# Patient Record
Sex: FEMALE | Race: BLACK OR AFRICAN AMERICAN | Employment: UNEMPLOYED | URBAN - METROPOLITAN AREA
[De-identification: names, ages, dates, MRNs, and addresses within clinical notes are randomized per-mention and may not be internally consistent; named-entity substitution may affect disease eponyms.]

---

## 2017-07-25 PROBLEM — R41.0 DISORIENTED: Status: ACTIVE | Noted: 2017-07-25

## 2018-03-02 PROBLEM — R41.0 DISORIENTED: Status: RESOLVED | Noted: 2017-07-25 | Resolved: 2018-03-02

## 2018-03-13 ENCOUNTER — HOSPITAL ENCOUNTER (OUTPATIENT)
Dept: SPEECH THERAPY | Age: 43
Setting detail: THERAPIES SERIES
Discharge: HOME | End: 2018-03-13
Payer: COMMERCIAL

## 2018-03-13 PROCEDURE — G8999 MOTOR SPEECH CURRENT STATUS: HCPCS

## 2018-03-13 PROCEDURE — 92523 SPEECH SOUND LANG COMPREHEN: CPT

## 2018-03-13 PROCEDURE — G9186 MOTOR SPEECH GOAL STATUS: HCPCS
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
## 2018-03-30 ENCOUNTER — APPOINTMENT (OUTPATIENT)
Dept: SPEECH THERAPY | Age: 43
End: 2018-03-30
Payer: COMMERCIAL

## 2018-04-06 ENCOUNTER — HOSPITAL ENCOUNTER (OUTPATIENT)
Dept: SPEECH THERAPY | Age: 43
Setting detail: THERAPIES SERIES
Discharge: HOME | End: 2018-04-06
Payer: COMMERCIAL

## 2018-04-06 PROCEDURE — G0515 COGNITIVE SKILLS DEVELOPMENT: HCPCS

## 2018-04-13 ENCOUNTER — HOSPITAL ENCOUNTER (OUTPATIENT)
Dept: SPEECH THERAPY | Age: 43
Setting detail: THERAPIES SERIES
Discharge: HOME | End: 2018-04-13
Payer: COMMERCIAL

## 2018-04-13 PROCEDURE — G8999 MOTOR SPEECH CURRENT STATUS: HCPCS

## 2018-04-13 PROCEDURE — G9186 MOTOR SPEECH GOAL STATUS: HCPCS

## 2018-04-13 PROCEDURE — G0515 COGNITIVE SKILLS DEVELOPMENT: HCPCS

## 2018-04-20 ENCOUNTER — HOSPITAL ENCOUNTER (OUTPATIENT)
Dept: SPEECH THERAPY | Age: 43
Setting detail: THERAPIES SERIES
Discharge: HOME | End: 2018-04-20
Payer: COMMERCIAL

## 2018-04-20 PROCEDURE — G0515 COGNITIVE SKILLS DEVELOPMENT: HCPCS

## 2018-04-20 PROCEDURE — G8999 MOTOR SPEECH CURRENT STATUS: HCPCS

## 2018-04-20 PROCEDURE — G9186 MOTOR SPEECH GOAL STATUS: HCPCS

## 2018-04-27 ENCOUNTER — APPOINTMENT (OUTPATIENT)
Dept: SPEECH THERAPY | Age: 43
End: 2018-04-27
Payer: COMMERCIAL

## 2018-05-04 ENCOUNTER — HOSPITAL ENCOUNTER (OUTPATIENT)
Dept: SPEECH THERAPY | Age: 43
Setting detail: THERAPIES SERIES
Discharge: HOME | End: 2018-05-04
Payer: COMMERCIAL

## 2018-05-04 PROCEDURE — G8999 MOTOR SPEECH CURRENT STATUS: HCPCS

## 2018-05-04 PROCEDURE — G9186 MOTOR SPEECH GOAL STATUS: HCPCS

## 2018-05-04 PROCEDURE — G0515 COGNITIVE SKILLS DEVELOPMENT: HCPCS

## 2018-05-18 ENCOUNTER — APPOINTMENT (OUTPATIENT)
Dept: SPEECH THERAPY | Age: 43
End: 2018-05-18
Payer: COMMERCIAL

## 2018-06-01 ENCOUNTER — HOSPITAL ENCOUNTER (OUTPATIENT)
Dept: SPEECH THERAPY | Age: 43
Setting detail: THERAPIES SERIES
Discharge: HOME | End: 2018-06-01
Payer: COMMERCIAL

## 2018-06-01 PROCEDURE — 97127 HC SP THER IVNTJ W/FOCUS COG FUNCJ: CPT

## 2018-06-01 PROCEDURE — G9186 MOTOR SPEECH GOAL STATUS: HCPCS

## 2018-06-01 PROCEDURE — G8999 MOTOR SPEECH CURRENT STATUS: HCPCS

## 2018-06-05 ENCOUNTER — HOSPITAL ENCOUNTER (OUTPATIENT)
Dept: SPEECH THERAPY | Age: 43
Setting detail: THERAPIES SERIES
Discharge: HOME | End: 2018-06-05
Payer: COMMERCIAL

## 2018-06-05 PROCEDURE — G9186 MOTOR SPEECH GOAL STATUS: HCPCS

## 2018-06-05 PROCEDURE — 92507 TX SP LANG VOICE COMM INDIV: CPT

## 2018-06-05 PROCEDURE — G8999 MOTOR SPEECH CURRENT STATUS: HCPCS

## 2018-06-15 ENCOUNTER — HOSPITAL ENCOUNTER (OUTPATIENT)
Dept: SPEECH THERAPY | Age: 43
Setting detail: THERAPIES SERIES
Discharge: HOME | End: 2018-06-15
Payer: COMMERCIAL

## 2018-06-15 PROCEDURE — 97127 HC SP THER IVNTJ W/FOCUS COG FUNCJ: CPT

## 2018-06-15 PROCEDURE — G8999 MOTOR SPEECH CURRENT STATUS: HCPCS

## 2018-06-15 PROCEDURE — G9186 MOTOR SPEECH GOAL STATUS: HCPCS

## 2018-06-22 ENCOUNTER — APPOINTMENT (OUTPATIENT)
Dept: SPEECH THERAPY | Age: 43
End: 2018-06-22
Payer: COMMERCIAL

## 2018-06-29 ENCOUNTER — HOSPITAL ENCOUNTER (OUTPATIENT)
Dept: SPEECH THERAPY | Age: 43
Setting detail: THERAPIES SERIES
Discharge: HOME | End: 2018-06-29
Payer: COMMERCIAL

## 2018-06-29 PROCEDURE — 92507 TX SP LANG VOICE COMM INDIV: CPT

## 2018-06-29 PROCEDURE — G8999 MOTOR SPEECH CURRENT STATUS: HCPCS

## 2018-06-29 PROCEDURE — G9186 MOTOR SPEECH GOAL STATUS: HCPCS

## 2018-07-02 ENCOUNTER — TELEPHONE (OUTPATIENT)
Dept: NEUROLOGY | Age: 43
End: 2018-07-02

## 2018-07-02 NOTE — TELEPHONE ENCOUNTER
Patient came in  for an appointment she thought that she had scheduled today, I told her that she did not have one until July 16th and she said that she will not be coming back and that she does not want to reschedule. She wanted a letter saying we were discharging her and I stated that we were not discharging her therefore I did not give her a letter discharging her from our practice. She understood and left the office.

## 2018-07-06 ENCOUNTER — APPOINTMENT (OUTPATIENT)
Dept: SPEECH THERAPY | Age: 43
End: 2018-07-06
Payer: COMMERCIAL

## 2018-07-27 ENCOUNTER — HOSPITAL ENCOUNTER (OUTPATIENT)
Dept: SPEECH THERAPY | Age: 43
Setting detail: THERAPIES SERIES
Discharge: HOME | End: 2018-07-27
Payer: COMMERCIAL

## 2018-07-27 PROCEDURE — G9186 MOTOR SPEECH GOAL STATUS: HCPCS

## 2018-07-27 PROCEDURE — G8999 MOTOR SPEECH CURRENT STATUS: HCPCS

## 2018-07-27 PROCEDURE — G9169 MEMORY GOAL STATUS: HCPCS

## 2018-07-27 PROCEDURE — G9168 MEMORY CURRENT STATUS: HCPCS

## 2018-07-27 PROCEDURE — 97127 HC SP THER IVNTJ W/FOCUS COG FUNCJ: CPT

## 2018-07-27 NOTE — PROGRESS NOTES
Ms. Eric Plunkett was seen for motor speech and cognitive therapy today. The following was targeted:  · Patient maintained her rate of speech without using a metronome today. She demonstrated appropriate rate. · Patient increased pitch and/or appropriately used pausing to indicate stress with % accuracy in conversation today. · Memory was targeted during a sustained attention activity that coupled auditory and visual stimuli. Patient sustained attention for up to 9 targets. · Memory was also targeted during a matching exercise. Patient utilized organizational strategies and verbal rehearsal to recall cards. She demonstrated the need for only minimal cues to assist.  · Eye contact was good today. Minimal cues were needed for eye contact. Patient was very animated today. She continues to report that the change in her Bipolar medications has helped. Homework was provided in order to aid in carryover. The next visit is cancelled due to the therapist's scheduled absence. Therapy is expected to resume on the patient's next scheduled visit. Continue plan of care. Treatment plan and goals can be found in the progress report.

## 2018-08-03 ENCOUNTER — APPOINTMENT (OUTPATIENT)
Dept: SPEECH THERAPY | Age: 43
End: 2018-08-03
Payer: COMMERCIAL

## 2018-08-08 ENCOUNTER — HOSPITAL ENCOUNTER (OUTPATIENT)
Age: 43
Discharge: HOME | End: 2018-08-08
Payer: COMMERCIAL

## 2018-08-08 LAB
ALBUMIN SERPL-MCNC: 3.9 G/DL (ref 3.5–5.2)
ALP BLD-CCNC: 54 U/L (ref 35–104)
ALT SERPL-CCNC: 8 U/L (ref 0–32)
ANION GAP SERPL CALCULATED.3IONS-SCNC: 11 MMOL/L (ref 7–16)
ANISOCYTOSIS: ABNORMAL
APTT: 28.8 SEC (ref 24.5–35.1)
AST SERPL-CCNC: 16 U/L (ref 0–31)
ATYPICAL LYMPHOCYTE RELATIVE PERCENT: 4 % (ref 0–4)
BASOPHILS ABSOLUTE: 0 E9/L (ref 0–0.2)
BASOPHILS RELATIVE PERCENT: 0 % (ref 0–2)
BILIRUB SERPL-MCNC: 0.5 MG/DL (ref 0–1.2)
BUN BLDV-MCNC: 9 MG/DL (ref 6–20)
CALCIUM SERPL-MCNC: 8.8 MG/DL (ref 8.6–10.2)
CHLORIDE BLD-SCNC: 102 MMOL/L (ref 98–107)
CHOLESTEROL, TOTAL: 174 MG/DL (ref 0–199)
CO2: 27 MMOL/L (ref 22–29)
CREAT SERPL-MCNC: 0.8 MG/DL (ref 0.5–1)
EOSINOPHILS ABSOLUTE: 0.06 E9/L (ref 0.05–0.5)
EOSINOPHILS RELATIVE PERCENT: 2 % (ref 0–6)
GFR AFRICAN AMERICAN: >60
GFR NON-AFRICAN AMERICAN: >60 ML/MIN/1.73
GLUCOSE BLD-MCNC: 82 MG/DL (ref 74–109)
HCT VFR BLD CALC: 33.7 % (ref 34–48)
HDLC SERPL-MCNC: 42 MG/DL
HEMOGLOBIN: 11.3 G/DL (ref 11.5–15.5)
INR BLD: 1.1
LDL CHOLESTEROL CALCULATED: 106 MG/DL (ref 0–99)
LYMPHOCYTES ABSOLUTE: 1.74 E9/L (ref 1.5–4)
LYMPHOCYTES RELATIVE PERCENT: 52 % (ref 20–42)
MCH RBC QN AUTO: 32.7 PG (ref 26–35)
MCHC RBC AUTO-ENTMCNC: 33.5 % (ref 32–34.5)
MCV RBC AUTO: 97.4 FL (ref 80–99.9)
MONOCYTES ABSOLUTE: 0.16 E9/L (ref 0.1–0.95)
MONOCYTES RELATIVE PERCENT: 5 % (ref 2–12)
NEUTROPHILS ABSOLUTE: 1.15 E9/L (ref 1.8–7.3)
NEUTROPHILS RELATIVE PERCENT: 37 % (ref 43–80)
OVALOCYTES: ABNORMAL
PDW BLD-RTO: 14.2 FL (ref 11.5–15)
PLATELET # BLD: 64 E9/L (ref 130–450)
PLATELET CONFIRMATION: NORMAL
PMV BLD AUTO: 11.3 FL (ref 7–12)
POIKILOCYTES: ABNORMAL
POTASSIUM SERPL-SCNC: 3.7 MMOL/L (ref 3.5–5)
PROTHROMBIN TIME: 12.5 SEC (ref 9.3–12.4)
RBC # BLD: 3.46 E12/L (ref 3.5–5.5)
SODIUM BLD-SCNC: 140 MMOL/L (ref 132–146)
TEAR DROP CELLS: ABNORMAL
TOTAL PROTEIN: 8.1 G/DL (ref 6.4–8.3)
TRIGL SERPL-MCNC: 128 MG/DL (ref 0–149)
VLDLC SERPL CALC-MCNC: 26 MG/DL
WBC # BLD: 3.1 E9/L (ref 4.5–11.5)

## 2018-08-08 PROCEDURE — 85025 COMPLETE CBC W/AUTO DIFF WBC: CPT

## 2018-08-08 PROCEDURE — 85730 THROMBOPLASTIN TIME PARTIAL: CPT

## 2018-08-08 PROCEDURE — 80053 COMPREHEN METABOLIC PANEL: CPT

## 2018-08-08 PROCEDURE — 85610 PROTHROMBIN TIME: CPT

## 2018-08-08 PROCEDURE — 80061 LIPID PANEL: CPT

## 2018-08-08 PROCEDURE — 36415 COLL VENOUS BLD VENIPUNCTURE: CPT

## 2018-08-08 PROCEDURE — 82652 VIT D 1 25-DIHYDROXY: CPT

## 2018-08-10 ENCOUNTER — HOSPITAL ENCOUNTER (OUTPATIENT)
Dept: SPEECH THERAPY | Age: 43
Setting detail: THERAPIES SERIES
Discharge: HOME | End: 2018-08-10
Payer: COMMERCIAL

## 2018-08-10 LAB — VITAMIN D 1,25-DIHYDROXY: 24.7 PG/ML (ref 19.9–79.3)

## 2018-08-10 PROCEDURE — G9168 MEMORY CURRENT STATUS: HCPCS

## 2018-08-10 PROCEDURE — G8999 MOTOR SPEECH CURRENT STATUS: HCPCS

## 2018-08-10 PROCEDURE — G9170 MEMORY D/C STATUS: HCPCS

## 2018-08-10 PROCEDURE — 92507 TX SP LANG VOICE COMM INDIV: CPT

## 2018-08-10 PROCEDURE — G9158 MOTOR SPEECH D/C STATUS: HCPCS

## 2018-08-10 PROCEDURE — G9186 MOTOR SPEECH GOAL STATUS: HCPCS

## 2018-08-10 PROCEDURE — G9169 MEMORY GOAL STATUS: HCPCS

## 2018-08-21 ENCOUNTER — HOSPITAL ENCOUNTER (OUTPATIENT)
Dept: GENERAL RADIOLOGY | Age: 43
Discharge: HOME | End: 2018-08-23
Payer: COMMERCIAL

## 2018-08-21 DIAGNOSIS — Z12.31 VISIT FOR SCREENING MAMMOGRAM: ICD-10-CM

## 2018-08-21 PROCEDURE — 77063 BREAST TOMOSYNTHESIS BI: CPT

## 2018-09-18 DIAGNOSIS — A60.9 HSV (HERPES SIMPLEX VIRUS) ANOGENITAL INFECTION: ICD-10-CM

## 2018-09-18 RX ORDER — ACYCLOVIR 400 MG/1
400 TABLET ORAL 2 TIMES DAILY
Qty: 60 TABLET | Refills: 5 | Status: SHIPPED | OUTPATIENT
Start: 2018-09-18 | End: 2019-09-30

## 2018-09-25 ENCOUNTER — OFFICE VISIT (OUTPATIENT)
Dept: OBGYN | Age: 43
End: 2018-09-25
Payer: COMMERCIAL

## 2018-09-25 VITALS
TEMPERATURE: 99 F | HEIGHT: 64 IN | DIASTOLIC BLOOD PRESSURE: 81 MMHG | HEART RATE: 76 BPM | BODY MASS INDEX: 32.95 KG/M2 | WEIGHT: 193 LBS | RESPIRATION RATE: 14 BRPM | SYSTOLIC BLOOD PRESSURE: 114 MMHG

## 2018-09-25 DIAGNOSIS — Z01.419 WELL WOMAN EXAM WITH ROUTINE GYNECOLOGICAL EXAM: Primary | ICD-10-CM

## 2018-09-25 DIAGNOSIS — A60.9 HSV (HERPES SIMPLEX VIRUS) ANOGENITAL INFECTION: ICD-10-CM

## 2018-09-25 PROCEDURE — 99396 PREV VISIT EST AGE 40-64: CPT | Performed by: NURSE PRACTITIONER

## 2018-09-25 PROCEDURE — 99213 OFFICE O/P EST LOW 20 MIN: CPT | Performed by: NURSE PRACTITIONER

## 2018-09-25 RX ORDER — DEXTROMETHORPHAN HYDROBROMIDE AND QUINIDINE SULFATE 20; 10 MG/1; MG/1
CAPSULE, GELATIN COATED ORAL
COMMUNITY
Start: 2018-06-28 | End: 2019-09-10 | Stop reason: ALTCHOICE

## 2018-09-25 RX ORDER — MIRTAZAPINE 7.5 MG/1
TABLET, FILM COATED ORAL
COMMUNITY
Start: 2018-06-26 | End: 2019-09-10 | Stop reason: ALTCHOICE

## 2018-09-25 RX ORDER — ACETAMINOPHEN 160 MG
TABLET,DISINTEGRATING ORAL
COMMUNITY
Start: 2018-06-19 | End: 2019-09-10

## 2018-09-25 RX ORDER — ALBUTEROL SULFATE 2.5 MG/3ML
2.5 SOLUTION RESPIRATORY (INHALATION)
COMMUNITY
Start: 2017-11-05 | End: 2021-08-17

## 2018-09-25 RX ORDER — ARIPIPRAZOLE 10 MG/1
10 TABLET ORAL
COMMUNITY
Start: 2018-08-06 | End: 2021-12-16 | Stop reason: ALTCHOICE

## 2018-09-25 RX ORDER — NAPROXEN 500 MG/1
500 TABLET ORAL
COMMUNITY
Start: 2018-04-27 | End: 2022-06-20 | Stop reason: ALTCHOICE

## 2018-09-25 ASSESSMENT — ENCOUNTER SYMPTOMS: GASTROINTESTINAL NEGATIVE: 1

## 2018-09-25 NOTE — PATIENT INSTRUCTIONS
Per Kanika Badillo patient is to return in a year for her annual exam  Patient was advised to return sooner if symptoms worsen or do not improve.

## 2018-09-25 NOTE — PROGRESS NOTES
1. Well woman exam with routine gynecological exam    2. HSV (herpes simplex virus) anogenital infection            Plan:      Continue Acyclovir. I ordered refills for patient recently that should last for the next 6 months. The patient was informed about the importance of regular gynecological  examination and pap smear. She was also  informed of the need for yearly mammogram after the age of 36. After age 48 ,a colonoscopy should be scheduled through her primary care physician (PCP). This will help decrease the risk of colon cancer. During the reproductive years, she should take folic acid daily in order to decrease the risk of neural tube defects such as spina bifida. Adequate calcium and vitamin D intake should be added to a healthy diet ,and weight bearing exercise continued daily for cardiovascular and bone health. The patient was reminded of the importance of safe sexual practices including a mutually monogamous relationship and the use of  barrier contraception. All vaccinations such as flu, tetanus, gardasil ( for cervical cancer ), meningitis, pneumonia and shingles should be updated by her PCP. If she is not established with a PCP, she may schedule an appointment at the medical clinic or at the [facility]. Return in about 1 year (around 9/25/2019), or if symptoms worsen or fail to improve. All questions and concerns addressed. Patient voices understanding of plan of care.           LONDON Yates CNM

## 2019-03-26 ENCOUNTER — HOSPITAL ENCOUNTER (OUTPATIENT)
Dept: PULMONOLOGY | Age: 44
Setting detail: THERAPIES SERIES
Discharge: HOME | End: 2019-03-26
Payer: COMMERCIAL

## 2019-04-02 ENCOUNTER — HOSPITAL ENCOUNTER (OUTPATIENT)
Dept: PULMONOLOGY | Age: 44
Setting detail: THERAPIES SERIES
Discharge: HOME | End: 2019-04-02
Payer: COMMERCIAL

## 2019-04-02 PROCEDURE — G0424 PULMONARY REHAB W EXER: HCPCS

## 2019-04-04 ENCOUNTER — HOSPITAL ENCOUNTER (OUTPATIENT)
Dept: PULMONOLOGY | Age: 44
Setting detail: THERAPIES SERIES
Discharge: HOME | End: 2019-04-04
Payer: COMMERCIAL

## 2019-04-04 PROCEDURE — G0424 PULMONARY REHAB W EXER: HCPCS

## 2019-04-09 ENCOUNTER — HOSPITAL ENCOUNTER (OUTPATIENT)
Dept: PULMONOLOGY | Age: 44
Setting detail: THERAPIES SERIES
Discharge: HOME | End: 2019-04-09
Payer: COMMERCIAL

## 2019-04-09 PROCEDURE — G0424 PULMONARY REHAB W EXER: HCPCS

## 2019-04-16 ENCOUNTER — HOSPITAL ENCOUNTER (OUTPATIENT)
Dept: PULMONOLOGY | Age: 44
Setting detail: THERAPIES SERIES
Discharge: HOME | End: 2019-04-16
Payer: COMMERCIAL

## 2019-04-16 PROCEDURE — G0424 PULMONARY REHAB W EXER: HCPCS

## 2019-04-18 ENCOUNTER — HOSPITAL ENCOUNTER (OUTPATIENT)
Dept: PULMONOLOGY | Age: 44
Setting detail: THERAPIES SERIES
Discharge: HOME | End: 2019-04-18
Payer: COMMERCIAL

## 2019-04-18 PROCEDURE — G0424 PULMONARY REHAB W EXER: HCPCS

## 2019-04-30 ENCOUNTER — HOSPITAL ENCOUNTER (OUTPATIENT)
Dept: PULMONOLOGY | Age: 44
Setting detail: THERAPIES SERIES
Discharge: HOME | End: 2019-04-30
Payer: COMMERCIAL

## 2019-04-30 PROCEDURE — G0424 PULMONARY REHAB W EXER: HCPCS

## 2019-05-02 ENCOUNTER — HOSPITAL ENCOUNTER (OUTPATIENT)
Dept: PULMONOLOGY | Age: 44
Setting detail: THERAPIES SERIES
Discharge: HOME | End: 2019-05-02
Payer: COMMERCIAL

## 2019-05-02 PROCEDURE — G0424 PULMONARY REHAB W EXER: HCPCS

## 2019-05-07 ENCOUNTER — HOSPITAL ENCOUNTER (OUTPATIENT)
Dept: PULMONOLOGY | Age: 44
Setting detail: THERAPIES SERIES
Discharge: HOME | End: 2019-05-07
Payer: COMMERCIAL

## 2019-05-07 PROCEDURE — G0424 PULMONARY REHAB W EXER: HCPCS

## 2019-05-09 ENCOUNTER — HOSPITAL ENCOUNTER (OUTPATIENT)
Dept: PULMONOLOGY | Age: 44
Setting detail: THERAPIES SERIES
Discharge: HOME | End: 2019-05-09
Payer: COMMERCIAL

## 2019-05-09 PROCEDURE — G0424 PULMONARY REHAB W EXER: HCPCS

## 2019-05-14 ENCOUNTER — HOSPITAL ENCOUNTER (OUTPATIENT)
Dept: PULMONOLOGY | Age: 44
Setting detail: THERAPIES SERIES
Discharge: HOME | End: 2019-05-14
Payer: COMMERCIAL

## 2019-05-14 PROCEDURE — G0424 PULMONARY REHAB W EXER: HCPCS

## 2019-05-30 ENCOUNTER — HOSPITAL ENCOUNTER (OUTPATIENT)
Dept: PULMONOLOGY | Age: 44
Setting detail: THERAPIES SERIES
Discharge: HOME | End: 2019-05-30
Payer: COMMERCIAL

## 2019-05-30 PROCEDURE — G0424 PULMONARY REHAB W EXER: HCPCS

## 2019-09-09 ASSESSMENT — ENCOUNTER SYMPTOMS
ABDOMINAL PAIN: 0
BLOOD IN STOOL: 0
VOMITING: 0
WHEEZING: 0
SHORTNESS OF BREATH: 0
SORE THROAT: 0
DIARRHEA: 0
NAUSEA: 0
BACK PAIN: 0
COUGH: 0
CONSTIPATION: 0

## 2019-09-09 NOTE — PROGRESS NOTES
Pulse 83   Temp 98.2 °F (36.8 °C) (Oral)   Resp 14   Ht 5' 4\" (1.626 m)   Wt 187 lb 12.8 oz (85.2 kg)   LMP 03/25/2015 (Exact Date)   SpO2 96%   BMI 32.24 kg/m²     LAST WEIGHT:  Wt Readings from Last 3 Encounters:   09/10/19 187 lb 12.8 oz (85.2 kg)   09/25/18 193 lb (87.5 kg)   03/02/18 197 lb (89.4 kg)       BMI Readings from Last 3 Encounters:   09/10/19 32.24 kg/m²   09/25/18 33.13 kg/m²   03/02/18 33.81 kg/m²       Physical Exam   Constitutional: She is oriented to person, place, and time. She appears well-developed and well-nourished. No distress. HENT:   Head: Normocephalic and atraumatic. Right Ear: External ear normal.   Left Ear: External ear normal.   Mouth/Throat: Oropharynx is clear and moist. No oropharyngeal exudate. Eyes: Pupils are equal, round, and reactive to light. Conjunctivae and EOM are normal. Right eye exhibits no discharge. No scleral icterus. Neck: Normal range of motion. Neck supple. No thyromegaly present. Cardiovascular: Normal rate, regular rhythm, normal heart sounds and intact distal pulses. No murmur heard. Pulmonary/Chest: Effort normal. No stridor. No respiratory distress. She has no wheezes. She has no rales. She exhibits no tenderness. Abdominal: Soft. Bowel sounds are normal. She exhibits no distension and no mass. There is no tenderness. There is no guarding. Musculoskeletal: Normal range of motion. She exhibits no edema or tenderness. Lymphadenopathy:     She has no cervical adenopathy. Neurological: She is alert and oriented to person, place, and time. Skin: Skin is warm and dry. No rash noted. She is not diaphoretic. No erythema. No pallor. Psychiatric: She has a normal mood and affect. Thought content normal. Her speech is delayed. She is slowed. Assessment / Plan:      Aaron Valenzuela was seen today for [facility].     Diagnoses and all orders for this visit:    Encounter to establish care    Annual physical exam  -     CBC Auto

## 2019-09-10 ENCOUNTER — OFFICE VISIT (OUTPATIENT)
Dept: FAMILY MEDICINE CLINIC | Age: 44
End: 2019-09-10
Payer: COMMERCIAL

## 2019-09-10 VITALS
HEART RATE: 83 BPM | DIASTOLIC BLOOD PRESSURE: 84 MMHG | WEIGHT: 187.8 LBS | RESPIRATION RATE: 14 BRPM | SYSTOLIC BLOOD PRESSURE: 122 MMHG | BODY MASS INDEX: 32.06 KG/M2 | TEMPERATURE: 98.2 F | OXYGEN SATURATION: 96 % | HEIGHT: 64 IN

## 2019-09-10 DIAGNOSIS — Z00.00 ANNUAL PHYSICAL EXAM: ICD-10-CM

## 2019-09-10 DIAGNOSIS — Z76.89 ENCOUNTER TO ESTABLISH CARE: Primary | ICD-10-CM

## 2019-09-10 PROCEDURE — 99386 PREV VISIT NEW AGE 40-64: CPT | Performed by: FAMILY MEDICINE

## 2019-09-10 RX ORDER — TRAZODONE HYDROCHLORIDE 50 MG/1
50 TABLET ORAL NIGHTLY
COMMUNITY
End: 2021-06-08

## 2019-09-10 RX ORDER — MYCOPHENOLIC ACID 360 MG/1
360 TABLET, DELAYED RELEASE ORAL 2 TIMES DAILY
COMMUNITY

## 2019-09-10 ASSESSMENT — PATIENT HEALTH QUESTIONNAIRE - PHQ9
1. LITTLE INTEREST OR PLEASURE IN DOING THINGS: 0
SUM OF ALL RESPONSES TO PHQ QUESTIONS 1-9: 0
SUM OF ALL RESPONSES TO PHQ9 QUESTIONS 1 & 2: 0
2. FEELING DOWN, DEPRESSED OR HOPELESS: 0
SUM OF ALL RESPONSES TO PHQ QUESTIONS 1-9: 0

## 2019-09-16 ENCOUNTER — NURSE ONLY (OUTPATIENT)
Dept: FAMILY MEDICINE CLINIC | Age: 44
End: 2019-09-16
Payer: COMMERCIAL

## 2019-09-16 ENCOUNTER — HOSPITAL ENCOUNTER (OUTPATIENT)
Age: 44
Discharge: HOME | End: 2019-09-18
Payer: COMMERCIAL

## 2019-09-16 DIAGNOSIS — G43.411 INTRACTABLE HEMIPLEGIC MIGRAINE WITH STATUS MIGRAINOSUS: ICD-10-CM

## 2019-09-16 DIAGNOSIS — Z00.00 ANNUAL PHYSICAL EXAM: ICD-10-CM

## 2019-09-16 LAB
ALBUMIN SERPL-MCNC: 3.9 G/DL (ref 3.5–5.2)
ALP BLD-CCNC: 65 U/L (ref 35–104)
ALT SERPL-CCNC: 9 U/L (ref 0–32)
ANION GAP SERPL CALCULATED.3IONS-SCNC: 13 MMOL/L (ref 7–16)
AST SERPL-CCNC: 19 U/L (ref 0–31)
BILIRUB SERPL-MCNC: 0.6 MG/DL (ref 0–1.2)
BUN BLDV-MCNC: 7 MG/DL (ref 6–20)
CALCIUM SERPL-MCNC: 8.9 MG/DL (ref 8.6–10.2)
CHLORIDE BLD-SCNC: 104 MMOL/L (ref 98–107)
CHOLESTEROL, TOTAL: 183 MG/DL (ref 0–199)
CO2: 27 MMOL/L (ref 22–29)
CREAT SERPL-MCNC: 1 MG/DL (ref 0.5–1)
FOLATE: 15.9 NG/ML (ref 4.8–24.2)
GFR AFRICAN AMERICAN: >60
GFR NON-AFRICAN AMERICAN: >60 ML/MIN/1.73
GLUCOSE BLD-MCNC: 77 MG/DL (ref 74–99)
HDLC SERPL-MCNC: 42 MG/DL
LDL CHOLESTEROL CALCULATED: 124 MG/DL (ref 0–99)
POTASSIUM SERPL-SCNC: 3.4 MMOL/L (ref 3.5–5)
SODIUM BLD-SCNC: 144 MMOL/L (ref 132–146)
TOTAL PROTEIN: 8.6 G/DL (ref 6.4–8.3)
TRIGL SERPL-MCNC: 83 MG/DL (ref 0–149)
TSH SERPL DL<=0.05 MIU/L-ACNC: 1.4 UIU/ML (ref 0.27–4.2)
VITAMIN B-12: 1141 PG/ML (ref 211–946)
VITAMIN D 25-HYDROXY: 33 NG/ML (ref 30–100)
VLDLC SERPL CALC-MCNC: 17 MG/DL

## 2019-09-16 PROCEDURE — 80061 LIPID PANEL: CPT

## 2019-09-16 PROCEDURE — 82607 VITAMIN B-12: CPT

## 2019-09-16 PROCEDURE — 36415 COLL VENOUS BLD VENIPUNCTURE: CPT | Performed by: FAMILY MEDICINE

## 2019-09-16 PROCEDURE — 82746 ASSAY OF FOLIC ACID SERUM: CPT

## 2019-09-16 PROCEDURE — 84443 ASSAY THYROID STIM HORMONE: CPT

## 2019-09-16 PROCEDURE — 82306 VITAMIN D 25 HYDROXY: CPT

## 2019-09-16 PROCEDURE — 80053 COMPREHEN METABOLIC PANEL: CPT

## 2019-09-17 ENCOUNTER — HOSPITAL ENCOUNTER (OUTPATIENT)
Age: 44
Discharge: HOME | End: 2019-09-19
Payer: COMMERCIAL

## 2019-09-17 PROCEDURE — 85025 COMPLETE CBC W/AUTO DIFF WBC: CPT

## 2019-09-18 DIAGNOSIS — D69.6 THROMBOCYTOPENIA (HCC): Primary | ICD-10-CM

## 2019-09-18 LAB
BASOPHILS ABSOLUTE: 0.02 E9/L (ref 0–0.2)
BASOPHILS RELATIVE PERCENT: 0.5 % (ref 0–2)
EOSINOPHILS ABSOLUTE: 0.02 E9/L (ref 0.05–0.5)
EOSINOPHILS RELATIVE PERCENT: 0.5 % (ref 0–6)
HCT VFR BLD CALC: 37.3 % (ref 34–48)
HEMOGLOBIN: 11.7 G/DL (ref 11.5–15.5)
IMMATURE GRANULOCYTES #: 0.01 E9/L
IMMATURE GRANULOCYTES %: 0.3 % (ref 0–5)
LYMPHOCYTES ABSOLUTE: 1.62 E9/L (ref 1.5–4)
LYMPHOCYTES RELATIVE PERCENT: 43.5 % (ref 20–42)
MCH RBC QN AUTO: 31.7 PG (ref 26–35)
MCHC RBC AUTO-ENTMCNC: 31.4 % (ref 32–34.5)
MCV RBC AUTO: 101.1 FL (ref 80–99.9)
MONOCYTES ABSOLUTE: 0.43 E9/L (ref 0.1–0.95)
MONOCYTES RELATIVE PERCENT: 11.6 % (ref 2–12)
NEUTROPHILS ABSOLUTE: 1.62 E9/L (ref 1.8–7.3)
NEUTROPHILS RELATIVE PERCENT: 43.6 % (ref 43–80)
PDW BLD-RTO: 15.2 FL (ref 11.5–15)
PLATELET # BLD: 68 E9/L (ref 130–450)
PLATELET CONFIRMATION: NORMAL
PMV BLD AUTO: ABNORMAL FL (ref 7–12)
RBC # BLD: 3.69 E12/L (ref 3.5–5.5)
WBC # BLD: 3.7 E9/L (ref 4.5–11.5)

## 2019-09-18 NOTE — RESULT ENCOUNTER NOTE
Please notify patient to come in for additional lab work. Please order and draw a manual platelet count DX: Thrombocytopenia.     Thank you

## 2019-09-19 ENCOUNTER — HOSPITAL ENCOUNTER (OUTPATIENT)
Age: 44
Discharge: HOME | End: 2019-09-19
Payer: COMMERCIAL

## 2019-09-19 DIAGNOSIS — D47.3 IDIOPATHIC THROMBOCYTHEMIA (HCC): Primary | ICD-10-CM

## 2019-09-19 DIAGNOSIS — D69.6 THROMBOCYTOPENIA (HCC): ICD-10-CM

## 2019-09-19 LAB
BASOPHILS ABSOLUTE: 0 E9/L (ref 0–0.2)
BASOPHILS RELATIVE PERCENT: 0 % (ref 0–2)
EOSINOPHILS ABSOLUTE: 0 E9/L (ref 0.05–0.5)
EOSINOPHILS RELATIVE PERCENT: 0.6 % (ref 0–6)
HCT VFR BLD CALC: 34.3 % (ref 34–48)
HEMOGLOBIN: 11.2 G/DL (ref 11.5–15.5)
LYMPHOCYTES ABSOLUTE: 1.02 E9/L (ref 1.5–4)
LYMPHOCYTES RELATIVE PERCENT: 31.3 % (ref 20–42)
MCH RBC QN AUTO: 31.7 PG (ref 26–35)
MCHC RBC AUTO-ENTMCNC: 32.7 % (ref 32–34.5)
MCV RBC AUTO: 97.2 FL (ref 80–99.9)
MONOCYTES ABSOLUTE: 0.3 E9/L (ref 0.1–0.95)
MONOCYTES RELATIVE PERCENT: 8.7 % (ref 2–12)
NEUTROPHILS ABSOLUTE: 1.98 E9/L (ref 1.8–7.3)
NEUTROPHILS RELATIVE PERCENT: 60 % (ref 43–80)
PDW BLD-RTO: 14.4 FL (ref 11.5–15)
PLATELET # BLD: 65 E9/L (ref 130–450)
PLATELET CONFIRMATION: NORMAL
PMV BLD AUTO: 13.2 FL (ref 7–12)
POIKILOCYTES: ABNORMAL
RBC # BLD: 3.53 E12/L (ref 3.5–5.5)
TEAR DROP CELLS: ABNORMAL
WBC # BLD: 3.3 E9/L (ref 4.5–11.5)

## 2019-09-19 PROCEDURE — 85025 COMPLETE CBC W/AUTO DIFF WBC: CPT

## 2019-09-19 PROCEDURE — 36415 COLL VENOUS BLD VENIPUNCTURE: CPT

## 2019-09-23 ENCOUNTER — TELEPHONE (OUTPATIENT)
Dept: ONCOLOGY | Age: 44
End: 2019-09-23

## 2019-09-26 NOTE — PROGRESS NOTES
Mira Caba is a 40 y.o. female who presents today for       Chief Complaint   Patient presents with    Other     pt had oncology appt. No one from this office contacted her with results so she didn't know she needed to be seen by oncology. she did not go to the appt       Patient was noted to have idiopathic thrombocytopenia x 2 separate lab draws. The following is an excerpt from letter composed to her Rheumatologist Dr. Vincent Meyer @ [facility] regarding her thrombocytopenia:    I took the opportunity to review her [facility] records, especially lab work. I note that she has thrombocytopenia, with last platelet count in 0/4668 of 56. I noted that she had been evaluated by Hematology in the past.     Has she ever had a bone marrow done? Has she ever had a trial of Prednisone? I realize that her values have been stable but I am concerned about such a low, albeit stable value      She was referred to Hematology (Dr. Sebastian Huerta) on 9/19/19 for further evaluation. Initial appointment is scheduled for 10/11/2019. She expressed concern and confusion as to why she did not hear from PCP about need for this referral, as she has been seen by Hematology in the past and did not understand why she needed to see another one. PCP explained that she though a second opinion was a good idea. Following thorough review of chart from [facility] and Hematology care, there was no documetation of previous bone marrow or previous trials of Prednisone. This was explained to patient that, although providers @ [facility] assessed thrombocytopenia to be sequelae of SLE, PCP felt that more workup was indicated. Following the above discussion, patient verbalized understanding and verbalized agreement with proposed plan of care    [address]:  Patient's past medical, surgical, social and/or family history reviewed, updated in chart, and are non-contributory (unless otherwise stated).   Medications and allergies also reviewed and updated in chart.    Review of Systems  Review of Systems   Unable to perform ROS: Other (ROS not indicated today)       Physical Exam:    VS:  /80 (Site: Left Upper Arm, Position: Sitting, Cuff Size: Medium Adult)   Pulse 84   Temp 98.4 °F (36.9 °C) (Oral)   Resp 16   Ht 5' 4\" (1.626 m)   Wt 189 lb 4 oz (85.8 kg)   LMP 03/25/2015 (Exact Date)   SpO2 (!) 84%   Breastfeeding? No   BMI 32.48 kg/m²     LAST WEIGHT:  Wt Readings from Last 3 Encounters:   09/27/19 189 lb 4 oz (85.8 kg)   09/10/19 187 lb 12.8 oz (85.2 kg)   09/25/18 193 lb (87.5 kg)       BMI Readings from Last 3 Encounters:   09/10/19 32.24 kg/m²   09/25/18 33.13 kg/m²   03/02/18 33.81 kg/m²     No PE performed today    Labs:  Lab Results   Component Value Date     09/16/2019    K 3.4 09/16/2019     09/16/2019    CO2 27 09/16/2019    BUN 7 09/16/2019    CREATININE 1.0 09/16/2019    PROT 8.6 09/16/2019    LABALBU 3.9 09/16/2019    LABALBU 4.1 05/17/2012    CALCIUM 8.9 09/16/2019    GFRAA >60 09/16/2019    LABGLOM >60 09/16/2019    GLUCOSE 77 09/16/2019    GLUCOSE 79 05/17/2012    AST 19 09/16/2019    ALT 9 09/16/2019    ALKPHOS 65 09/16/2019    BILITOT 0.6 09/16/2019    TSH 1.400 09/16/2019    CHOL 183 09/16/2019    TRIG 83 09/16/2019    HDL 42 09/16/2019    LDLCALC 124 09/16/2019        Recent Labs     09/19/19  0750 09/17/19  0847   WBC 3.3* 3.7*   HGB 11.2* 11.7   HCT 34.3 37.3   MCV 97.2 101.1*   PLT 65* 68*             Assessment / Plan:      Aaron Padilla was seen today for other. Diagnoses and all orders for this visit:    Idiopathic thrombocythemia ([address]): The following is communication to Dr. Nato Muro:             This patient is coming to see you 10/11/19. She has a history of lupus and is under the care of a                      rheumatologist @ [facility]. She does have a history of thrombocytopenia with values around 60. This is stable but I am                            concerned.   After combing through her [facility] [employer]

## 2019-09-27 ENCOUNTER — OFFICE VISIT (OUTPATIENT)
Dept: FAMILY MEDICINE CLINIC | Age: 44
End: 2019-09-27
Payer: COMMERCIAL

## 2019-09-27 VITALS
WEIGHT: 189.25 LBS | HEART RATE: 84 BPM | DIASTOLIC BLOOD PRESSURE: 80 MMHG | TEMPERATURE: 98.4 F | BODY MASS INDEX: 32.31 KG/M2 | SYSTOLIC BLOOD PRESSURE: 110 MMHG | HEIGHT: 64 IN | OXYGEN SATURATION: 84 % | RESPIRATION RATE: 16 BRPM

## 2019-09-27 DIAGNOSIS — D47.3 IDIOPATHIC THROMBOCYTHEMIA (HCC): Primary | ICD-10-CM

## 2019-09-27 PROCEDURE — 99213 OFFICE O/P EST LOW 20 MIN: CPT | Performed by: FAMILY MEDICINE

## 2019-09-27 PROCEDURE — 1036F TOBACCO NON-USER: CPT | Performed by: FAMILY MEDICINE

## 2019-09-27 PROCEDURE — G8427 DOCREV CUR MEDS BY ELIG CLIN: HCPCS | Performed by: FAMILY MEDICINE

## 2019-09-27 PROCEDURE — G8417 CALC BMI ABV UP PARAM F/U: HCPCS | Performed by: FAMILY MEDICINE

## 2019-09-30 ENCOUNTER — OFFICE VISIT (OUTPATIENT)
Dept: OBGYN | Age: 44
End: 2019-09-30
Payer: COMMERCIAL

## 2019-09-30 VITALS
DIASTOLIC BLOOD PRESSURE: 85 MMHG | HEART RATE: 91 BPM | SYSTOLIC BLOOD PRESSURE: 135 MMHG | WEIGHT: 189 LBS | HEIGHT: 64 IN | BODY MASS INDEX: 32.27 KG/M2

## 2019-09-30 DIAGNOSIS — Z12.39 BREAST CANCER SCREENING: ICD-10-CM

## 2019-09-30 DIAGNOSIS — Z01.419 WELL WOMAN EXAM WITH ROUTINE GYNECOLOGICAL EXAM: Primary | ICD-10-CM

## 2019-09-30 DIAGNOSIS — A60.9 HSV (HERPES SIMPLEX VIRUS) ANOGENITAL INFECTION: ICD-10-CM

## 2019-09-30 PROCEDURE — 99213 OFFICE O/P EST LOW 20 MIN: CPT | Performed by: NURSE PRACTITIONER

## 2019-09-30 PROCEDURE — 99396 PREV VISIT EST AGE 40-64: CPT | Performed by: NURSE PRACTITIONER

## 2019-09-30 RX ORDER — ACYCLOVIR 400 MG/1
400 TABLET ORAL 3 TIMES DAILY
Qty: 15 TABLET | Refills: 5 | Status: SHIPPED | OUTPATIENT
Start: 2019-09-30 | End: 2019-10-11 | Stop reason: ALTCHOICE

## 2019-09-30 ASSESSMENT — ENCOUNTER SYMPTOMS: GASTROINTESTINAL NEGATIVE: 1

## 2019-09-30 NOTE — PROGRESS NOTES
is alert and oriented to person, place, and time. Skin: Skin is warm and dry. Psychiatric: She has a normal mood and affect. Nursing note and vitals reviewed. Assessment:      1. Well woman exam with routine gynecological exam    2. Breast cancer screening    3. HSV (herpes simplex virus) anogenital infection            Plan:      Orders Placed This Encounter   Procedures    GAGE DIGITAL SCREEN W CAD BILATERAL     Orders Placed This Encounter   Medications    acyclovir (ZOVIRAX) 400 MG tablet     Sig: Take 1 tablet by mouth 3 times daily     Dispense:  15 tablet     Refill:  5     R/B/A of suppressive versus episodic treatment reviewed with patient. The patient was informed about the importance of regular gynecological  examination. She was also  informed of the need for yearly mammogram after the age of 36. After age 48 ,a colonoscopy should be scheduled through her primary care physician (PCP). This will help decrease the risk of colon cancer. Adequate calcium and vitamin D intake should be added to a healthy diet ,and weight bearing exercise continued daily for cardiovascular and bone health. The patient was reminded of the importance of safe sexual practices including a mutually monogamous relationship and the use of  barrier contraception. All vaccinations such as flu, tetanus, gardasil ( for cervical cancer ), meningitis, pneumonia and shingles should be updated by her PCP. If she is not established with a PCP, she may schedule an appointment at the medical clinic or at the [facility]. Return in about 1 year (around 9/30/2020), or if symptoms worsen or fail to improve, for annual exam.  All questions and concerns addressed. Patient voices understanding of plan of care.           LONDON Hall CNM

## 2019-10-11 ENCOUNTER — HOSPITAL ENCOUNTER (OUTPATIENT)
Dept: INFUSION THERAPY | Age: 44
Discharge: HOME | End: 2019-10-11
Payer: COMMERCIAL

## 2019-10-11 ENCOUNTER — OFFICE VISIT (OUTPATIENT)
Dept: ONCOLOGY | Age: 44
End: 2019-10-11
Payer: COMMERCIAL

## 2019-10-11 VITALS
TEMPERATURE: 98.1 F | SYSTOLIC BLOOD PRESSURE: 149 MMHG | BODY MASS INDEX: 31.82 KG/M2 | HEART RATE: 87 BPM | WEIGHT: 186.4 LBS | DIASTOLIC BLOOD PRESSURE: 80 MMHG | HEIGHT: 64 IN

## 2019-10-11 DIAGNOSIS — D61.818 PANCYTOPENIA (HCC): ICD-10-CM

## 2019-10-11 DIAGNOSIS — D47.3 IDIOPATHIC THROMBOCYTHEMIA (HCC): Primary | ICD-10-CM

## 2019-10-11 LAB
ANISOCYTOSIS: ABNORMAL
BASOPHILS ABSOLUTE: 0 E9/L (ref 0–0.2)
BASOPHILS RELATIVE PERCENT: 0.3 % (ref 0–2)
EOSINOPHILS ABSOLUTE: 0 E9/L (ref 0.05–0.5)
EOSINOPHILS RELATIVE PERCENT: 1 % (ref 0–6)
FERRITIN: 92 NG/ML
HCT VFR BLD CALC: 34.7 % (ref 34–48)
HEMOGLOBIN: 11.5 G/DL (ref 11.5–15.5)
IMMATURE RETIC FRACT: 14.8 % (ref 3–15.9)
IRON SATURATION: 18 % (ref 15–50)
IRON: 61 MCG/DL (ref 37–145)
LYMPHOCYTES ABSOLUTE: 1.05 E9/L (ref 1.5–4)
LYMPHOCYTES RELATIVE PERCENT: 33.9 % (ref 20–42)
MCH RBC QN AUTO: 31.9 PG (ref 26–35)
MCHC RBC AUTO-ENTMCNC: 33.1 % (ref 32–34.5)
MCV RBC AUTO: 96.4 FL (ref 80–99.9)
MONOCYTES ABSOLUTE: 0.28 E9/L (ref 0.1–0.95)
MONOCYTES RELATIVE PERCENT: 8.7 % (ref 2–12)
NEUTROPHILS ABSOLUTE: 1.77 E9/L (ref 1.8–7.3)
NEUTROPHILS RELATIVE PERCENT: 57.4 % (ref 43–80)
OVALOCYTES: ABNORMAL
PATHOLOGIST REVIEW: NORMAL
PDW BLD-RTO: 14.2 FL (ref 11.5–15)
PLATELET # BLD: 69 E9/L (ref 130–450)
PLATELET CONFIRMATION: NORMAL
PMV BLD AUTO: 12.4 FL (ref 7–12)
POIKILOCYTES: ABNORMAL
RBC # BLD: 3.6 E12/L (ref 3.5–5.5)
RETIC HGB EQUIVALENT: 35 PG (ref 28.2–36.6)
RETICULOCYTE ABSOLUTE COUNT: 0.04 E12/L
RETICULOCYTE COUNT PCT: 1.1 % (ref 0.4–1.9)
TOTAL IRON BINDING CAPACITY: 341 MCG/DL (ref 250–450)
TSH SERPL DL<=0.05 MIU/L-ACNC: 2.05 UIU/ML (ref 0.27–4.2)
WBC # BLD: 3.1 E9/L (ref 4.5–11.5)

## 2019-10-11 PROCEDURE — 80074 ACUTE HEPATITIS PANEL: CPT

## 2019-10-11 PROCEDURE — G8417 CALC BMI ABV UP PARAM F/U: HCPCS | Performed by: INTERNAL MEDICINE

## 2019-10-11 PROCEDURE — 99214 OFFICE O/P EST MOD 30 MIN: CPT | Performed by: INTERNAL MEDICINE

## 2019-10-11 PROCEDURE — 83550 IRON BINDING TEST: CPT

## 2019-10-11 PROCEDURE — G8427 DOCREV CUR MEDS BY ELIG CLIN: HCPCS | Performed by: INTERNAL MEDICINE

## 2019-10-11 PROCEDURE — 84443 ASSAY THYROID STIM HORMONE: CPT

## 2019-10-11 PROCEDURE — 83540 ASSAY OF IRON: CPT

## 2019-10-11 PROCEDURE — 84165 PROTEIN E-PHORESIS SERUM: CPT

## 2019-10-11 PROCEDURE — 85025 COMPLETE CBC W/AUTO DIFF WBC: CPT

## 2019-10-11 PROCEDURE — 1036F TOBACCO NON-USER: CPT | Performed by: INTERNAL MEDICINE

## 2019-10-11 PROCEDURE — 86703 HIV-1/HIV-2 1 RESULT ANTBDY: CPT

## 2019-10-11 PROCEDURE — 82668 ASSAY OF ERYTHROPOIETIN: CPT

## 2019-10-11 PROCEDURE — 82728 ASSAY OF FERRITIN: CPT

## 2019-10-11 PROCEDURE — G8484 FLU IMMUNIZE NO ADMIN: HCPCS | Performed by: INTERNAL MEDICINE

## 2019-10-11 PROCEDURE — 85045 AUTOMATED RETICULOCYTE COUNT: CPT

## 2019-10-11 PROCEDURE — 99205 OFFICE O/P NEW HI 60 MIN: CPT | Performed by: INTERNAL MEDICINE

## 2019-10-11 SDOH — ECONOMIC STABILITY: HOUSING INSECURITY: PLEASE ASSESS YOUR PATIENT'S LEVEL OF DISTRESS CONCERNING HOUSING (SCALE FROM 1-10): 0 (NONE)

## 2019-10-13 LAB — ERYTHROPOIETIN: 17 MU/ML (ref 4–27)

## 2019-10-14 LAB
ALBUMIN SERPL-MCNC: 3.6 G/DL (ref 3.5–4.7)
ALPHA-1-GLOBULIN: 0.3 G/DL (ref 0.2–0.4)
ALPHA-2-GLOBULIN: 0.6 G/FL (ref 0.5–1)
BETA GLOBULIN: 1.2 G/DL (ref 0.8–1.3)
ELECTROPHORESIS: ABNORMAL
GAMMA GLOBULIN: 2.6 G/DL (ref 0.7–1.6)
HAV IGM SER IA-ACNC: NORMAL
HEPATITIS B CORE IGM ANTIBODY: NORMAL
HEPATITIS B SURFACE ANTIGEN INTERPRETATION: NORMAL
HEPATITIS C ANTIBODY INTERPRETATION: NORMAL
HIV-1 AND HIV-2 ANTIBODIES: NORMAL
TOTAL PROTEIN: 8.3 G/DL (ref 6.4–8.3)

## 2019-11-01 ENCOUNTER — OFFICE VISIT (OUTPATIENT)
Dept: ONCOLOGY | Age: 44
End: 2019-11-01
Payer: MEDICARE

## 2019-11-01 ENCOUNTER — HOSPITAL ENCOUNTER (OUTPATIENT)
Dept: INFUSION THERAPY | Age: 44
Discharge: HOME | End: 2019-11-01
Payer: MEDICARE

## 2019-11-01 VITALS
HEART RATE: 90 BPM | DIASTOLIC BLOOD PRESSURE: 82 MMHG | TEMPERATURE: 98 F | SYSTOLIC BLOOD PRESSURE: 122 MMHG | HEIGHT: 64 IN | BODY MASS INDEX: 31.69 KG/M2 | WEIGHT: 185.6 LBS

## 2019-11-01 DIAGNOSIS — D61.818 PANCYTOPENIA (HCC): Primary | ICD-10-CM

## 2019-11-01 DIAGNOSIS — D61.818 PANCYTOPENIA (HCC): ICD-10-CM

## 2019-11-01 PROCEDURE — 88184 FLOWCYTOMETRY/ TC 1 MARKER: CPT

## 2019-11-01 PROCEDURE — 1036F TOBACCO NON-USER: CPT | Performed by: INTERNAL MEDICINE

## 2019-11-01 PROCEDURE — G8484 FLU IMMUNIZE NO ADMIN: HCPCS | Performed by: INTERNAL MEDICINE

## 2019-11-01 PROCEDURE — 99214 OFFICE O/P EST MOD 30 MIN: CPT | Performed by: INTERNAL MEDICINE

## 2019-11-01 PROCEDURE — 99212 OFFICE O/P EST SF 10 MIN: CPT

## 2019-11-01 PROCEDURE — G8427 DOCREV CUR MEDS BY ELIG CLIN: HCPCS | Performed by: INTERNAL MEDICINE

## 2019-11-01 PROCEDURE — G8417 CALC BMI ABV UP PARAM F/U: HCPCS | Performed by: INTERNAL MEDICINE

## 2019-11-01 PROCEDURE — 88185 FLOWCYTOMETRY/TC ADD-ON: CPT

## 2019-11-01 PROCEDURE — 36415 COLL VENOUS BLD VENIPUNCTURE: CPT

## 2019-11-05 LAB
Lab: NORMAL
REPORT: NORMAL
THIS TEST SENT TO: NORMAL

## 2019-11-19 ENCOUNTER — HOSPITAL ENCOUNTER (OUTPATIENT)
Dept: INFUSION THERAPY | Age: 44
Discharge: HOME | End: 2019-11-19
Payer: MEDICARE

## 2019-11-19 ENCOUNTER — OFFICE VISIT (OUTPATIENT)
Dept: ONCOLOGY | Age: 44
End: 2019-11-19
Payer: MEDICARE

## 2019-11-19 VITALS
SYSTOLIC BLOOD PRESSURE: 127 MMHG | WEIGHT: 183.8 LBS | HEIGHT: 64 IN | TEMPERATURE: 97.4 F | DIASTOLIC BLOOD PRESSURE: 75 MMHG | BODY MASS INDEX: 31.38 KG/M2 | HEART RATE: 89 BPM

## 2019-11-19 DIAGNOSIS — C85.10 B-CELL LYMPHOMA, UNSPECIFIED B-CELL LYMPHOMA TYPE, UNSPECIFIED BODY REGION (HCC): ICD-10-CM

## 2019-11-19 DIAGNOSIS — C85.10 B-CELL LYMPHOMA, UNSPECIFIED B-CELL LYMPHOMA TYPE, UNSPECIFIED BODY REGION (HCC): Primary | ICD-10-CM

## 2019-11-19 LAB
BUN BLDV-MCNC: 7 MG/DL (ref 6–20)
CREAT SERPL-MCNC: 0.9 MG/DL (ref 0.5–1)
GFR AFRICAN AMERICAN: >60
GFR NON-AFRICAN AMERICAN: >60 ML/MIN/1.73

## 2019-11-19 PROCEDURE — 82565 ASSAY OF CREATININE: CPT

## 2019-11-19 PROCEDURE — 36415 COLL VENOUS BLD VENIPUNCTURE: CPT | Performed by: INTERNAL MEDICINE

## 2019-11-19 PROCEDURE — 99214 OFFICE O/P EST MOD 30 MIN: CPT | Performed by: INTERNAL MEDICINE

## 2019-11-19 PROCEDURE — 99213 OFFICE O/P EST LOW 20 MIN: CPT | Performed by: INTERNAL MEDICINE

## 2019-11-19 PROCEDURE — G8417 CALC BMI ABV UP PARAM F/U: HCPCS | Performed by: INTERNAL MEDICINE

## 2019-11-19 PROCEDURE — G8427 DOCREV CUR MEDS BY ELIG CLIN: HCPCS | Performed by: INTERNAL MEDICINE

## 2019-11-19 PROCEDURE — 84520 ASSAY OF UREA NITROGEN: CPT

## 2019-11-19 PROCEDURE — G8484 FLU IMMUNIZE NO ADMIN: HCPCS | Performed by: INTERNAL MEDICINE

## 2019-11-19 PROCEDURE — 1036F TOBACCO NON-USER: CPT | Performed by: INTERNAL MEDICINE

## 2019-11-30 ENCOUNTER — HOSPITAL ENCOUNTER (OUTPATIENT)
Dept: CT IMAGING | Age: 44
Discharge: HOME | End: 2019-12-02
Payer: MEDICARE

## 2019-11-30 ENCOUNTER — HOSPITAL ENCOUNTER (OUTPATIENT)
Age: 44
Discharge: HOME | End: 2019-12-02
Payer: MEDICARE

## 2019-11-30 ENCOUNTER — HOSPITAL ENCOUNTER (OUTPATIENT)
Dept: ULTRASOUND IMAGING | Age: 44
Discharge: HOME | End: 2019-12-02
Payer: MEDICARE

## 2019-11-30 DIAGNOSIS — C85.10 B-CELL LYMPHOMA, UNSPECIFIED B-CELL LYMPHOMA TYPE, UNSPECIFIED BODY REGION (HCC): ICD-10-CM

## 2019-11-30 DIAGNOSIS — I82.621 ACUTE DEEP VEIN THROMBOSIS (DVT) OF RIGHT UPPER EXTREMITY, UNSPECIFIED VEIN (HCC): Primary | ICD-10-CM

## 2019-11-30 DIAGNOSIS — I82.621 ACUTE DEEP VEIN THROMBOSIS (DVT) OF RIGHT UPPER EXTREMITY, UNSPECIFIED VEIN (HCC): ICD-10-CM

## 2019-11-30 PROCEDURE — 93971 EXTREMITY STUDY: CPT

## 2019-11-30 PROCEDURE — 71250 CT THORAX DX C-: CPT

## 2019-11-30 PROCEDURE — 2580000003 HC RX 258: Performed by: RADIOLOGY

## 2019-11-30 PROCEDURE — 6360000004 HC RX CONTRAST MEDICATION: Performed by: RADIOLOGY

## 2019-11-30 PROCEDURE — 70490 CT SOFT TISSUE NECK W/O DYE: CPT

## 2019-11-30 PROCEDURE — 74176 CT ABD & PELVIS W/O CONTRAST: CPT

## 2019-11-30 RX ORDER — SODIUM CHLORIDE 0.9 % (FLUSH) 0.9 %
10 SYRINGE (ML) INJECTION ONCE
Status: COMPLETED | OUTPATIENT
Start: 2019-11-30 | End: 2019-11-30

## 2019-11-30 RX ADMIN — IOHEXOL 50 ML: 240 INJECTION, SOLUTION INTRATHECAL; INTRAVASCULAR; INTRAVENOUS; ORAL at 09:28

## 2019-11-30 RX ADMIN — IOPAMIDOL 20 ML: 755 INJECTION, SOLUTION INTRAVENOUS at 09:28

## 2019-11-30 RX ADMIN — Medication 10 ML: at 09:28

## 2019-12-10 ENCOUNTER — HOSPITAL ENCOUNTER (OUTPATIENT)
Dept: INFUSION THERAPY | Age: 44
Discharge: HOME | End: 2019-12-10
Payer: MEDICARE

## 2019-12-10 ENCOUNTER — OFFICE VISIT (OUTPATIENT)
Dept: ONCOLOGY | Age: 44
End: 2019-12-10
Payer: MEDICARE

## 2019-12-10 VITALS
DIASTOLIC BLOOD PRESSURE: 80 MMHG | HEART RATE: 88 BPM | SYSTOLIC BLOOD PRESSURE: 137 MMHG | OXYGEN SATURATION: 98 % | BODY MASS INDEX: 31.45 KG/M2 | HEIGHT: 64 IN | WEIGHT: 184.2 LBS | TEMPERATURE: 98 F

## 2019-12-10 DIAGNOSIS — D47.3 IDIOPATHIC THROMBOCYTHEMIA (HCC): ICD-10-CM

## 2019-12-10 DIAGNOSIS — J34.89 MASS OF PYRIFORM SINUS: ICD-10-CM

## 2019-12-10 DIAGNOSIS — R59.1 LYMPHADENOPATHY: Primary | ICD-10-CM

## 2019-12-10 PROCEDURE — 1036F TOBACCO NON-USER: CPT | Performed by: INTERNAL MEDICINE

## 2019-12-10 PROCEDURE — 99212 OFFICE O/P EST SF 10 MIN: CPT | Performed by: INTERNAL MEDICINE

## 2019-12-10 PROCEDURE — G8417 CALC BMI ABV UP PARAM F/U: HCPCS | Performed by: INTERNAL MEDICINE

## 2019-12-10 PROCEDURE — G8427 DOCREV CUR MEDS BY ELIG CLIN: HCPCS | Performed by: INTERNAL MEDICINE

## 2019-12-10 PROCEDURE — 99214 OFFICE O/P EST MOD 30 MIN: CPT | Performed by: INTERNAL MEDICINE

## 2019-12-10 PROCEDURE — G8484 FLU IMMUNIZE NO ADMIN: HCPCS | Performed by: INTERNAL MEDICINE

## 2019-12-16 ENCOUNTER — HOSPITAL ENCOUNTER (OUTPATIENT)
Dept: GENERAL RADIOLOGY | Age: 44
Discharge: HOME | End: 2019-12-18
Payer: MEDICARE

## 2019-12-16 DIAGNOSIS — Z12.39 BREAST CANCER SCREENING: ICD-10-CM

## 2019-12-16 DIAGNOSIS — Z01.419 WELL WOMAN EXAM WITH ROUTINE GYNECOLOGICAL EXAM: ICD-10-CM

## 2019-12-16 PROCEDURE — 77063 BREAST TOMOSYNTHESIS BI: CPT

## 2019-12-17 ENCOUNTER — TELEPHONE (OUTPATIENT)
Dept: ADMINISTRATIVE | Age: 44
End: 2019-12-17

## 2019-12-17 ENCOUNTER — HOSPITAL ENCOUNTER (OUTPATIENT)
Dept: ULTRASOUND IMAGING | Age: 44
Discharge: HOME | End: 2019-12-19
Payer: MEDICARE

## 2019-12-17 ENCOUNTER — OFFICE VISIT (OUTPATIENT)
Dept: OBGYN | Age: 44
End: 2019-12-17
Payer: MEDICARE

## 2019-12-17 VITALS
RESPIRATION RATE: 16 BRPM | HEART RATE: 86 BPM | HEIGHT: 64 IN | WEIGHT: 187 LBS | DIASTOLIC BLOOD PRESSURE: 81 MMHG | TEMPERATURE: 98.3 F | SYSTOLIC BLOOD PRESSURE: 121 MMHG | BODY MASS INDEX: 31.92 KG/M2

## 2019-12-17 DIAGNOSIS — R19.00 PELVIC MASS: Primary | ICD-10-CM

## 2019-12-17 DIAGNOSIS — R19.00 PELVIC MASS: ICD-10-CM

## 2019-12-17 PROCEDURE — G8427 DOCREV CUR MEDS BY ELIG CLIN: HCPCS | Performed by: NURSE PRACTITIONER

## 2019-12-17 PROCEDURE — 99213 OFFICE O/P EST LOW 20 MIN: CPT | Performed by: NURSE PRACTITIONER

## 2019-12-17 PROCEDURE — 76856 US EXAM PELVIC COMPLETE: CPT

## 2019-12-17 PROCEDURE — G8417 CALC BMI ABV UP PARAM F/U: HCPCS | Performed by: NURSE PRACTITIONER

## 2019-12-17 PROCEDURE — G8484 FLU IMMUNIZE NO ADMIN: HCPCS | Performed by: NURSE PRACTITIONER

## 2019-12-17 PROCEDURE — 76830 TRANSVAGINAL US NON-OB: CPT

## 2019-12-17 PROCEDURE — 1036F TOBACCO NON-USER: CPT | Performed by: NURSE PRACTITIONER

## 2019-12-17 ASSESSMENT — ENCOUNTER SYMPTOMS: ABDOMINAL PAIN: 1

## 2019-12-18 ENCOUNTER — TELEPHONE (OUTPATIENT)
Dept: OBGYN | Age: 44
End: 2019-12-18

## 2019-12-18 DIAGNOSIS — N83.291 COMPLEX CYST OF RIGHT OVARY: Primary | ICD-10-CM

## 2019-12-23 ENCOUNTER — OFFICE VISIT (OUTPATIENT)
Dept: ENT CLINIC | Age: 44
End: 2019-12-23
Payer: MEDICARE

## 2019-12-23 VITALS
SYSTOLIC BLOOD PRESSURE: 127 MMHG | DIASTOLIC BLOOD PRESSURE: 83 MMHG | BODY MASS INDEX: 31.24 KG/M2 | WEIGHT: 183 LBS | HEART RATE: 87 BPM | HEIGHT: 64 IN

## 2019-12-23 DIAGNOSIS — R93.89 ABNORMAL CT SCAN: Primary | ICD-10-CM

## 2019-12-23 PROCEDURE — 1036F TOBACCO NON-USER: CPT | Performed by: OTOLARYNGOLOGY

## 2019-12-23 PROCEDURE — G8417 CALC BMI ABV UP PARAM F/U: HCPCS | Performed by: OTOLARYNGOLOGY

## 2019-12-23 PROCEDURE — 99204 OFFICE O/P NEW MOD 45 MIN: CPT | Performed by: OTOLARYNGOLOGY

## 2019-12-23 PROCEDURE — G8484 FLU IMMUNIZE NO ADMIN: HCPCS | Performed by: OTOLARYNGOLOGY

## 2019-12-23 PROCEDURE — G8427 DOCREV CUR MEDS BY ELIG CLIN: HCPCS | Performed by: OTOLARYNGOLOGY

## 2019-12-23 ASSESSMENT — ENCOUNTER SYMPTOMS
TROUBLE SWALLOWING: 0
SINUS PRESSURE: 0
VOICE CHANGE: 0
SORE THROAT: 0
SHORTNESS OF BREATH: 0

## 2020-01-02 NOTE — PROGRESS NOTES
reports that she is starting to feel better). She is interested in seeking care at the [facility]    Discussed pros and cons of stopping her medications, including risks of flare ups and exacerbations of all underlying conditions. She verbalizes understanding and wants to proceed with a non medication approach to her health      Depression:  PHQ-9=15 (1/7/2020). She reports that she stopped all psych meds. She is exhausted. She is interested in a more holistic approach to her health, including a WFPB diet (started x 2 weeks and reports that she is starting to feel better). She is interested in seeking care at the [facility]    Discussed pros and cons of stopping her medications, including risks of flare ups and exacerbations of all underlying conditions. She verbalizes understanding and wants to proceed with a non medication approach to her health    [address]:  Patient's past medical, surgical, social and/or family history reviewed, updated in chart, and are non-contributory (unless otherwise stated). Medications and allergies also reviewed and updated in chart. [address] [facility]:  Patient's past medical, surgical, social and/or family history reviewed, updated in chart, and are non-contributory (unless otherwise stated). Medications and allergies also reviewed and updated in chart. Review of Systems  Review of Systems   Unable to perform ROS: Other (ROS not indicated today)   HENT: Positive for ear pain. Negative for congestion and sore throat. Right ear pain. Describes as intermittent and sharp. Onset x a couple months earlier. She has sinus drainage and cough that is improving but does not seem to be related to the ear pain   Respiratory: Negative for cough, shortness of breath and wheezing. Cardiovascular: Negative for chest pain, palpitations and leg swelling.    Gastrointestinal: Negative for abdominal pain, blood in stool, constipation, diarrhea, nausea and vomiting. Genitourinary: Negative for dysuria, frequency, hematuria and urgency. Musculoskeletal: Negative for back pain, myalgias and neck pain. Skin: Negative for rash. Neurological: Negative for dizziness, weakness and headaches. Psychiatric/Behavioral: The patient is not nervous/anxious. Physical Exam:    VS:  /78   Pulse 76   Temp 98.5 °F (36.9 °C) (Oral)   Resp 18   Ht 5' 4\" (1.626 m)   Wt 186 lb (84.4 kg)   LMP 03/25/2015 (Exact Date)   SpO2 98%   BMI 31.93 kg/m²     LAST WEIGHT:  Wt Readings from Last 3 Encounters:   01/07/20 186 lb (84.4 kg)   12/23/19 183 lb (83 kg)   12/17/19 187 lb (84.8 kg)       BMI Readings from Last 3 Encounters:   01/07/20 31.93 kg/m²   12/23/19 31.41 kg/m²   12/17/19 32.10 kg/m²       Physical Exam  Constitutional:       General: She is not in acute distress. Appearance: She is well-developed. She is not diaphoretic. HENT:      Head: Normocephalic and atraumatic. Right Ear: External ear normal. There is impacted cerumen. Left Ear: External ear normal. There is impacted cerumen. Mouth/Throat:      Pharynx: No oropharyngeal exudate. Eyes:      General: No scleral icterus. Right eye: No discharge. Conjunctiva/sclera: Conjunctivae normal.      Pupils: Pupils are equal, round, and reactive to light. Neck:      Musculoskeletal: Normal range of motion and neck supple. Thyroid: No thyromegaly. Cardiovascular:      Rate and Rhythm: Normal rate and regular rhythm. Heart sounds: Normal heart sounds. No murmur. Pulmonary:      Effort: Pulmonary effort is normal. No respiratory distress. Breath sounds: No stridor. No wheezing or rales. Chest:      Chest wall: No tenderness. Abdominal:      General: Bowel sounds are normal. There is no distension. Palpations: Abdomen is soft. There is no mass. Tenderness: There is no tenderness. There is no guarding.    Musculoskeletal: Normal range of motion. General: No tenderness. Lymphadenopathy:      Cervical: No cervical adenopathy. Skin:     General: Skin is warm and dry. Coloration: Skin is not pale. Findings: No erythema or rash. Neurological:      Mental Status: She is alert and oriented to person, place, and time. Psychiatric:         Behavior: Behavior normal.         Thought Content: Thought content normal.         Labs:  Lab Results   Component Value Date     09/16/2019    K 3.4 09/16/2019     09/16/2019    CO2 27 09/16/2019    BUN 7 11/19/2019    CREATININE 0.9 11/19/2019    PROT 8.3 10/11/2019    LABALBU 3.6 10/11/2019    LABALBU 4.1 05/17/2012    CALCIUM 8.9 09/16/2019    GFRAA >60 11/19/2019    LABGLOM >60 11/19/2019    GLUCOSE 77 09/16/2019    GLUCOSE 79 05/17/2012    AST 19 09/16/2019    ALT 9 09/16/2019    ALKPHOS 65 09/16/2019    BILITOT 0.6 09/16/2019    TSH 2.050 10/11/2019    CHOL 183 09/16/2019    TRIG 83 09/16/2019    HDL 42 09/16/2019    LDLCALC 124 09/16/2019        Lab Results   Component Value Date    CHOL 183 09/16/2019    CHOL 174 08/08/2018    CHOL 170 06/20/2015     Lab Results   Component Value Date    TRIG 83 09/16/2019    TRIG 128 08/08/2018    TRIG 75 06/20/2015     Lab Results   Component Value Date    HDL 42 09/16/2019    HDL 42 08/08/2018    HDL 46 06/20/2015     Lab Results   Component Value Date    LDLCALC 124 (H) 09/16/2019    LDLCALC 106 (H) 08/08/2018    LDLCALC 109 (H) 06/20/2015       No results found for: LABA1C  Lab Results   Component Value Date    LDLCALC 124 (H) 09/16/2019    CREATININE 0.9 11/19/2019         Assessment / Plan:      Nakia Zamorano was seen today for 3 month follow-up, discuss medications and flu vaccine. Diagnoses and all orders for this visit:    SLE (systemic lupus erythematosus related syndrome) ([facility] [address]): On no meds at this time    Empty sella syndrome ([facility] [address]    Idiopathic thrombocythemia ([facility] [address]):  To meet with Dr. Karishma Uriarte (Heme/Onc) before any additional treatment

## 2020-01-07 ENCOUNTER — OFFICE VISIT (OUTPATIENT)
Dept: FAMILY MEDICINE CLINIC | Age: 45
End: 2020-01-07
Payer: MEDICARE

## 2020-01-07 VITALS
HEART RATE: 76 BPM | OXYGEN SATURATION: 98 % | RESPIRATION RATE: 18 BRPM | BODY MASS INDEX: 31.76 KG/M2 | HEIGHT: 64 IN | TEMPERATURE: 98.5 F | DIASTOLIC BLOOD PRESSURE: 78 MMHG | WEIGHT: 186 LBS | SYSTOLIC BLOOD PRESSURE: 110 MMHG

## 2020-01-07 PROBLEM — H61.23 BILATERAL IMPACTED CERUMEN: Status: ACTIVE | Noted: 2020-01-07

## 2020-01-07 PROCEDURE — G8484 FLU IMMUNIZE NO ADMIN: HCPCS | Performed by: FAMILY MEDICINE

## 2020-01-07 PROCEDURE — 99214 OFFICE O/P EST MOD 30 MIN: CPT | Performed by: FAMILY MEDICINE

## 2020-01-07 PROCEDURE — G8427 DOCREV CUR MEDS BY ELIG CLIN: HCPCS | Performed by: FAMILY MEDICINE

## 2020-01-07 PROCEDURE — 1036F TOBACCO NON-USER: CPT | Performed by: FAMILY MEDICINE

## 2020-01-07 PROCEDURE — G8417 CALC BMI ABV UP PARAM F/U: HCPCS | Performed by: FAMILY MEDICINE

## 2020-01-07 ASSESSMENT — PATIENT HEALTH QUESTIONNAIRE - PHQ9
9. THOUGHTS THAT YOU WOULD BE BETTER OFF DEAD, OR OF HURTING YOURSELF: 0
SUM OF ALL RESPONSES TO PHQ9 QUESTIONS 1 & 2: 6
10. IF YOU CHECKED OFF ANY PROBLEMS, HOW DIFFICULT HAVE THESE PROBLEMS MADE IT FOR YOU TO DO YOUR WORK, TAKE CARE OF THINGS AT HOME, OR GET ALONG WITH OTHER PEOPLE: 2
SUM OF ALL RESPONSES TO PHQ QUESTIONS 1-9: 15
2. FEELING DOWN, DEPRESSED OR HOPELESS: 3
6. FEELING BAD ABOUT YOURSELF - OR THAT YOU ARE A FAILURE OR HAVE LET YOURSELF OR YOUR FAMILY DOWN: 3
SUM OF ALL RESPONSES TO PHQ QUESTIONS 1-9: 15
3. TROUBLE FALLING OR STAYING ASLEEP: 0
8. MOVING OR SPEAKING SO SLOWLY THAT OTHER PEOPLE COULD HAVE NOTICED. OR THE OPPOSITE, BEING SO FIGETY OR RESTLESS THAT YOU HAVE BEEN MOVING AROUND A LOT MORE THAN USUAL: 0
1. LITTLE INTEREST OR PLEASURE IN DOING THINGS: 3
5. POOR APPETITE OR OVEREATING: 0
4. FEELING TIRED OR HAVING LITTLE ENERGY: 3
7. TROUBLE CONCENTRATING ON THINGS, SUCH AS READING THE NEWSPAPER OR WATCHING TELEVISION: 3

## 2020-01-07 ASSESSMENT — ENCOUNTER SYMPTOMS
COUGH: 0
BLOOD IN STOOL: 0
BACK PAIN: 0
SORE THROAT: 0
ABDOMINAL PAIN: 0
CONSTIPATION: 0
DIARRHEA: 0
WHEEZING: 0
VOMITING: 0
NAUSEA: 0
SHORTNESS OF BREATH: 0

## 2020-01-07 NOTE — PATIENT INSTRUCTIONS
Patient Education        carbamide peroxide (otic)  Pronunciation:  MONICA ba mide per OX rodrigo [place] tik  Brand:  Auraphene-B, Debrox, Ear Wax, Ear Wax Removal, Mollifene, Murine Ear Drops  What is the most important information I should know about carbamide peroxide? You should not use this medicine if you have a hole in your ear drum (ruptured ear drum), or if you have any signs of ear infection or injury, such as pain, warmth, swelling, drainage, or bleeding. What is carbamide peroxide? Carbamide peroxide otic (for the ears) is used to soften and loosen ear wax, making it easier to remove. Carbamide peroxide may also be used for purposes not listed in this medication guide. What should I discuss with my healthcare provider before using carbamide peroxide? You should not use carbamide peroxide otic if you are allergic to it, or if you have a hole in your ear drum (ruptured ear drum). Ask a doctor or pharmacist if it is safe for you to use this medicine if you have other medical conditions, especially:  · recent ear surgery or injury;  · ear pain, itching, or other irritation;  · drainage, discharge, or bleeding from the ear; or  · warmth or swelling around the ear. Carbamide peroxide otic should not be used on a child younger than 15years old. How should I use carbamide peroxide? Use exactly as directed on the label, or as prescribed by your doctor. Do not use in larger or smaller amounts or for longer than recommended. Carbamide peroxide otic comes with patient instructions for safe and effective use. Follow these directions carefully. Ask your doctor or pharmacist if you have any questions. Wash your hands before and after using this medicine. To use the ear drops:  · Lie down or tilt your head with your ear facing upward. Open the ear canal by gently pulling your ear back, or pulling downward on the earlobe when giving this medicine to a child.   · Hold the dropper upside down over your ear and drop the if it is almost time for your next scheduled dose. Do not use extra medicine to make up the missed dose. What happens if I overdose? An overdose of carbamide peroxide otic is not expected to be dangerous. Seek emergency medical attention or call the Poison Help line at 1-516.590.2970 if anyone has accidentally swallowed the medication. What should I avoid while using carbamide peroxide? Avoid getting this medicine in your eyes or mouth. Do not use other ear drops unless your doctor has told you to. What are the possible side effects of carbamide peroxide? Get emergency medical help if you have signs of an allergic reaction: hives; difficult breathing; swelling of your face, lips, tongue, or throat. Stop using carbamide peroxide otic and call your doctor at once if you have:  · dizziness; or  · new or worsening ear problems. Common side effects may include:  · a foaming or crackling sound in the ear after using the ear drops;  · temporary decrease in hearing after using the drops;  · mild feeling of fullness in the ear; or  · mild itching inside the ear. This is not a complete list of side effects and others may occur. Call your doctor for medical advice about side effects. You may report side effects to FDA at 3-135-ZQJ-3514. What other drugs will affect carbamide peroxide? It is not likely that other drugs you take orally or inject will have an effect on carbamide peroxide used in the ears. But many drugs can interact with each other. Tell each of your healthcare providers about all medicines you use, including prescription and over-the-counter medicines, vitamins, and herbal products. Where can I get more information? Your pharmacist can provide more information about carbamide peroxide. Remember, keep this and all other medicines out of the reach of children, never share your medicines with others, and use this medication only for the indication prescribed.   Every effort has been made to ensure that the information provided by [address] is accurate, up-to-date, and complete, but no guarantee is made to that effect. Drug information contained herein may be time sensitive. [facility] information has been compiled for use by healthcare practitioners and consumers in the [place] and therefore [facility] does not warrant that uses outside of the [place] are appropriate, unless specifically indicated otherwise. [facility]'s drug information does not endorse drugs, diagnose patients or recommend therapy. [facility]'s drug information is an informational resource designed to assist licensed healthcare practitioners in caring for their patients and/or to serve consumers viewing this service as a supplement to, and not a substitute for, the expertise, skill, knowledge and judgment of healthcare practitioners. The absence of a warning for a given drug or drug combination in no way should be construed to indicate that the drug or drug combination is safe, effective or appropriate for any given patient. [facility] does not assume any responsibility for any aspect of healthcare administered with the aid of information [facility] provides. The information contained herein is not intended to cover all possible uses, directions, precautions, warnings, drug interactions, allergic reactions, or adverse effects. If you have questions about the drugs you are taking, check with your doctor, nurse or pharmacist.  Copyright 0242-5459 [address]. Version: 4.01. Revision date: 3/15/2017. Care instructions adapted under license by [facility] ([facility]). If you have questions about a medical condition or this instruction, always ask your healthcare professional. [place] [address] any warranty or liability for your use of this information.        Patient Education        Earwax Blockage: Care Instructions  Your Care Instructions    Earwax is a natural substance that protects the ear canal. Normally, earwax

## 2020-01-10 ENCOUNTER — HOSPITAL ENCOUNTER (OUTPATIENT)
Dept: INFUSION THERAPY | Age: 45
End: 2020-01-10
Payer: MEDICARE

## 2021-06-07 ASSESSMENT — ENCOUNTER SYMPTOMS
VOMITING: 0
BLOOD IN STOOL: 0
SORE THROAT: 0
COUGH: 0
WHEEZING: 0
DIARRHEA: 0
BACK PAIN: 0
SHORTNESS OF BREATH: 0
CONSTIPATION: 0

## 2021-06-07 NOTE — PROGRESS NOTES
José Luis Zeng is a 39 y.o. female who presents today for    Last seen by PCP 1/2020, before COVID. Chief Complaint   Patient presents with    Dizziness     when stands up feels like is leaning on side or another, has to hold onto something    Tingling     all over body, headaches, feels like has lump in throat, has trouble swallowing. feels like none of these symptoms are coming from the lupus. She has multiple concerns today, including lightheadedness/dizziness, headaches,and pain    Lightheadedness/Dizziness: Onset x 2 months ago. Denies injury or trauma. When stands up feels like is leaning on side or another, has to hold onto something. Denies unilateral weakness. Reports some vertigo but not necessarily connected to current symptoms. Denies Tobacco, EtOH, or drug use. Stress worsens symptoms; sleep improves symptoms. She reports that she is getting increasingly anxious by discussing her symptoms; she is getting symptomatic just by talking about it. She also endorses poor nutrition (</= 1 meal/day), poor hydration (32 oz water/day), suboptimal sleep, and sporadic exercise    Headaches: Onset x 3-4 months. Denies injury/trauma. Location: starts with pressure behind left eye and radiates across to right side of head. Frontal symptoms are tightness; parietal symptoms are throbbing; occipital symptoms described as \"digging\" pain. Duration: intermittent. Severity: max 8-9/10. Ibuprofen/Tylenol takes the edge off of symptoms. Symptoms are worse with even normal conversation. Rest can improve symptoms. No PMH/FH migraine headaches    Pain:  Onset x 2 months. Location: generalized, but UE and LE are the worst.  She describes \"alia\" pain that does not seen to be connected to usual lupus flare ups. Rest improved symptoms. Overexertion worsens. Trials Prednisone and Hydroxychloroquine prescribed by Rheumatologist provided no relief.      She has a diagnosis of SLE since age 22 Psychiatric/Behavioral: Positive for dysphoric mood and sleep disturbance. The patient is nervous/anxious. Frequently stressed       Physical Exam:    VS:  /80   Pulse 87   Temp 97.2 °F (36.2 °C) (Infrared)   Resp 18   Ht 5' 4\" (1.626 m)   Wt 171 lb (77.6 kg)   LMP 03/25/2015 (Exact Date)   SpO2 98%   BMI 29.35 kg/m²     LAST WEIGHT:  Wt Readings from Last 3 Encounters:   06/08/21 171 lb (77.6 kg)   01/07/20 186 lb (84.4 kg)   12/23/19 183 lb (83 kg)       BMI Readings from Last 3 Encounters:   06/08/21 29.35 kg/m²   01/07/20 31.93 kg/m²   12/23/19 31.41 kg/m²       Physical Exam  Constitutional:       General: She is not in acute distress. Appearance: She is well-developed. She is not diaphoretic. HENT:      Head: Normocephalic and atraumatic. Right Ear: External ear normal. There is impacted cerumen. Left Ear: External ear normal. There is impacted cerumen. Mouth/Throat:      Pharynx: No oropharyngeal exudate. Eyes:      General: No scleral icterus. Right eye: No discharge. Conjunctiva/sclera: Conjunctivae normal.      Pupils: Pupils are equal, round, and reactive to light. Neck:      Thyroid: No thyromegaly. Cardiovascular:      Rate and Rhythm: Normal rate and regular rhythm. Heart sounds: Normal heart sounds. No murmur heard. Pulmonary:      Effort: Pulmonary effort is normal. No respiratory distress. Breath sounds: No stridor. No wheezing or rales. Chest:      Chest wall: Tenderness (multiple tender trigger points with palpation) present. Abdominal:      General: Bowel sounds are normal. There is no distension. Palpations: Abdomen is soft. There is no mass. Tenderness: There is no abdominal tenderness. There is no guarding. Musculoskeletal:         General: Normal range of motion. Cervical back: Normal range of motion and neck supple. Tenderness (multiple tender trigger points with very tight muscles) present. Thoracic back: Tenderness present. Lumbar back: Tenderness present. Lymphadenopathy:      Cervical: No cervical adenopathy. Skin:     General: Skin is warm and dry. Coloration: Skin is not pale. Findings: No erythema or rash. Neurological:      Mental Status: She is alert and oriented to person, place, and time. Psychiatric:         Behavior: Behavior normal.         Thought Content: Thought content normal.         Labs:  Lab Results   Component Value Date     09/16/2019    K 3.4 09/16/2019     09/16/2019    CO2 27 09/16/2019    BUN 7 11/19/2019    CREATININE 0.9 11/19/2019    PROT 8.3 10/11/2019    LABALBU 3.6 10/11/2019    LABALBU 4.1 05/17/2012    CALCIUM 8.9 09/16/2019    GFRAA >60 11/19/2019    LABGLOM >60 11/19/2019    GLUCOSE 77 09/16/2019    GLUCOSE 79 05/17/2012    AST 19 09/16/2019    ALT 9 09/16/2019    ALKPHOS 65 09/16/2019    BILITOT 0.6 09/16/2019    TSH 2.050 10/11/2019    CHOL 183 09/16/2019    TRIG 83 09/16/2019    HDL 42 09/16/2019    LDLCALC 124 09/16/2019        Lab Results   Component Value Date    CHOL 183 09/16/2019    CHOL 174 08/08/2018    CHOL 170 06/20/2015     Lab Results   Component Value Date    TRIG 83 09/16/2019    TRIG 128 08/08/2018    TRIG 75 06/20/2015     Lab Results   Component Value Date    HDL 42 09/16/2019    HDL 42 08/08/2018    HDL 46 06/20/2015     Lab Results   Component Value Date    LDLCALC 124 (H) 09/16/2019    LDLCALC 106 (H) 08/08/2018    LDLCALC 109 (H) 06/20/2015       No results found for: LABA1C  Lab Results   Component Value Date    LDLCALC 124 (H) 09/16/2019    CREATININE 0.9 11/19/2019           Assessment / Plan:      Hellen Burks was seen today for dizziness and tingling.     Diagnoses and all orders for this visit:    Fibromyalgia: New diagnosis based on FIQ and PE        -     Written and verbal information about fibromyalgia provided to patient, including the important role that adequate nutrition, adequate hydration, exercise, optimal sleep and stress management play in the management of this disorder        -     After the above explanation, PCP recommended trial of medication to promote/maintain optimal sleep  -     Trial amitriptyline (ELAVIL) 25 MG tablet; Take 1/2 tablet by mouth nightly    Fatigue, unspecified type: Differential diagnosis includes hormonal dysfunction versus fibromyalgia  -     Anti-Thyroglobulin Antibody; Future  -     Follicle Stimulating Hormone; Future  -     Luteinizing Hormone; Future  -     TSH without Reflex; Future  -     T4; Future  -     T4, Free; Future  -     Thyroid Peroxidase Antibody; Future  -     ESTROGENS, FRACTIONATED; Future  -     PROGESTERONE; Future  -     amitriptyline (ELAVIL) 25 MG tablet; Take 1 tablet by mouth nightly    Night sweats  -     Anti-Thyroglobulin Antibody; Future  -     Follicle Stimulating Hormone; Future  -     Luteinizing Hormone; Future  -     TSH without Reflex; Future  -     T4; Future  -     T4, Free; Future  -     Thyroid Peroxidase Antibody; Future  -     ESTROGENS, FRACTIONATED;  Future  -     PROGESTERONE; Future    Lightheadedness: differential diagnosis may include fibromyalgia, inadequate nutrition and inadequate hydration        -     Plan of care as above for fibromyalgia        -     Patient counseled about the importance of adequate nutrition, adequate hydration, optimal sleep    Episodic paroxysmal hemicrania, not intractable: differential diagnosis may include fibromyalgia, inadequate nutrition and inadequate hydration        -     Plan of care as above for fibromyalgia        -     Patient counseled about the importance of adequate nutrition, adequate hydration, optimal sleep    Pancytopenia ([facility] [address]): stable    SLE (systemic lupus erythematosus related syndrome) [facility]): Under care of [facility]    Empty sella syndrome ([facility] [address]    Idiopathic thrombocythemia ([facility] [address]): Stable    Psychophysiological insomnia        -     Plan of care as above for fibromyalgia        -     Patient counseled about the importance of adequate nutrition, adequate hydration, optimal sleep        -     amitriptyline (ELAVIL) 25 MG tablet; Take 1 tablet by mouth nightly      Time spent in pre-visit planning, interview, exam, /education and coordination of care: 67 minutes     Call or go to ED immediately if symptoms worsen or persist.    Follow Up:  Return in about 3 weeks (around 6/29/2021), or F/U 3 weeks (Vv, Friday AM), for to assess response of symptomsto treatment recommendations. or sooner if necessary. Educational materials (fibromyalgia, sleep health/insomnia, amitriptyline) printed for patient's review and were included in patient instructions on his/her AfterVisit Summary and given to patient at the end of visit. Counseled regarding above diagnosis,including possible risks and complications,  especially if left uncontrolled. Counseled regarding the possible side effects, risks, benefits and alternatives to treatment; patient and/or guardian verbalizes understanding, agrees, feels comfortable with and wishes to proceed with above treatment plan. Advised patient tocall with any new medication issues, and read all Rx info from pharmacy to assureaware of all possible risks and side effects of medication before taking. Reviewed age and gender appropriate health screening exams and vaccinations. Advisedpatient regarding importance of keeping up with recommended health maintenance andto schedule as soon as possible if overdue, as this is important in assessing forundiagnosed pathology, especially cancer, as well as protecting against potentially harmful/life threatening disease. Patient and/or guardian verbalizes understandingand agrees with above counseling, assessment and plan. All questions answered.     Marissa Boykin MD

## 2021-06-08 ENCOUNTER — OFFICE VISIT (OUTPATIENT)
Dept: FAMILY MEDICINE CLINIC | Age: 46
End: 2021-06-08
Payer: MEDICARE

## 2021-06-08 VITALS
BODY MASS INDEX: 29.19 KG/M2 | DIASTOLIC BLOOD PRESSURE: 80 MMHG | RESPIRATION RATE: 18 BRPM | SYSTOLIC BLOOD PRESSURE: 122 MMHG | HEIGHT: 64 IN | HEART RATE: 87 BPM | WEIGHT: 171 LBS | TEMPERATURE: 97.2 F | OXYGEN SATURATION: 98 %

## 2021-06-08 DIAGNOSIS — G44.039 EPISODIC PAROXYSMAL HEMICRANIA, NOT INTRACTABLE: ICD-10-CM

## 2021-06-08 DIAGNOSIS — M79.7 FIBROMYALGIA: Primary | ICD-10-CM

## 2021-06-08 DIAGNOSIS — D61.818 PANCYTOPENIA (HCC): ICD-10-CM

## 2021-06-08 DIAGNOSIS — M32.9 SLE (SYSTEMIC LUPUS ERYTHEMATOSUS RELATED SYNDROME) (HCC): ICD-10-CM

## 2021-06-08 DIAGNOSIS — R53.83 FATIGUE, UNSPECIFIED TYPE: ICD-10-CM

## 2021-06-08 DIAGNOSIS — R61 NIGHT SWEATS: ICD-10-CM

## 2021-06-08 DIAGNOSIS — F51.04 PSYCHOPHYSIOLOGICAL INSOMNIA: ICD-10-CM

## 2021-06-08 DIAGNOSIS — D47.3 IDIOPATHIC THROMBOCYTHEMIA (HCC): ICD-10-CM

## 2021-06-08 DIAGNOSIS — E23.6 EMPTY SELLA SYNDROME (HCC): ICD-10-CM

## 2021-06-08 DIAGNOSIS — R42 LIGHTHEADEDNESS: ICD-10-CM

## 2021-06-08 LAB
FOLLICLE STIMULATING HORMONE: 51.6 MIU/ML
LUTEINIZING HORMONE: 35.8 MIU/ML
T4 FREE: 1.36 NG/DL (ref 0.93–1.7)
T4 TOTAL: 9.2 MCG/DL (ref 4.5–11.7)
TSH SERPL DL<=0.05 MIU/L-ACNC: 1.52 UIU/ML (ref 0.27–4.2)

## 2021-06-08 PROCEDURE — G8419 CALC BMI OUT NRM PARAM NOF/U: HCPCS | Performed by: FAMILY MEDICINE

## 2021-06-08 PROCEDURE — G8427 DOCREV CUR MEDS BY ELIG CLIN: HCPCS | Performed by: FAMILY MEDICINE

## 2021-06-08 PROCEDURE — 99215 OFFICE O/P EST HI 40 MIN: CPT | Performed by: FAMILY MEDICINE

## 2021-06-08 PROCEDURE — 1036F TOBACCO NON-USER: CPT | Performed by: FAMILY MEDICINE

## 2021-06-08 RX ORDER — AMITRIPTYLINE HYDROCHLORIDE 25 MG/1
25 TABLET, FILM COATED ORAL NIGHTLY
Qty: 90 TABLET | Refills: 1 | Status: SHIPPED
Start: 2021-06-08 | End: 2021-06-08 | Stop reason: DRUGHIGH

## 2021-06-08 RX ORDER — AMITRIPTYLINE HYDROCHLORIDE 25 MG/1
25 TABLET, FILM COATED ORAL NIGHTLY
Qty: 90 TABLET | Refills: 1 | Status: SHIPPED
Start: 2021-06-08 | End: 2021-12-08

## 2021-06-08 SDOH — ECONOMIC STABILITY: FOOD INSECURITY: WITHIN THE PAST 12 MONTHS, THE FOOD YOU BOUGHT JUST DIDN'T LAST AND YOU DIDN'T HAVE MONEY TO GET MORE.: NEVER TRUE

## 2021-06-08 SDOH — ECONOMIC STABILITY: FOOD INSECURITY: WITHIN THE PAST 12 MONTHS, YOU WORRIED THAT YOUR FOOD WOULD RUN OUT BEFORE YOU GOT MONEY TO BUY MORE.: NEVER TRUE

## 2021-06-08 ASSESSMENT — PATIENT HEALTH QUESTIONNAIRE - PHQ9
2. FEELING DOWN, DEPRESSED OR HOPELESS: 0
SUM OF ALL RESPONSES TO PHQ QUESTIONS 1-9: 2
SUM OF ALL RESPONSES TO PHQ9 QUESTIONS 1 & 2: 2
SUM OF ALL RESPONSES TO PHQ QUESTIONS 1-9: 2
1. LITTLE INTEREST OR PLEASURE IN DOING THINGS: 2
SUM OF ALL RESPONSES TO PHQ QUESTIONS 1-9: 2

## 2021-06-08 ASSESSMENT — SOCIAL DETERMINANTS OF HEALTH (SDOH): HOW HARD IS IT FOR YOU TO PAY FOR THE VERY BASICS LIKE FOOD, HOUSING, MEDICAL CARE, AND HEATING?: PATIENT DECLINED

## 2021-06-08 ASSESSMENT — ENCOUNTER SYMPTOMS
ABDOMINAL PAIN: 1
NAUSEA: 1

## 2021-06-08 NOTE — PATIENT INSTRUCTIONS
Patient Education     TAKE AMITRIPTYLINE 1/2 TABLET AT BEDTIME     Fibromyalgia: Care Instructions  Overview     Fibromyalgia is a painful condition that is not completely understood by medical experts. The cause of fibromyalgia is not known. It can make you feel tired and ache all over. It causes tender spots at specific points of the body that hurt only when you press on them. You may have trouble sleeping, as well as other symptoms. These problems can upset your work and home life. Symptoms tend to come and go, although they may never go away completely. Fibromyalgia does not harm your muscles, joints, or organs. Follow-up care is a key part of your treatment and safety. Be sure to make and go to all appointments, and call your doctor if you are having problems. It's also a good idea to know your test results and keep a list of the medicines you take. How can you care for yourself at home? · Exercise often. Walk, swim, or bike to help with pain and sleep problems and to make you feel better. · Try to get a good night's sleep. Go to bed and get up at the same time each day, whether you feel rested or not. Make sure you have a good mattress and pillow. · Reduce stress. Avoid things that cause you stress, if you can. If not, work at making them less stressful. Learn to use biofeedback, guided imagery, meditation, or other methods to relax. · Make healthy changes. Eat a balanced diet, quit smoking, and limit alcohol and caffeine. · Use a heating pad set on low or take warm baths or showers for pain. Using cold packs for up to 20 minutes at a time can also relieve pain. Put a thin cloth between the cold pack and your skin. A gentle massage might help too. · Be safe with medicines. Take your medicines exactly as prescribed. Call your doctor if you think you are having a problem with your medicine. Your doctor may talk to you about taking antidepressant medicines.  These medicines may improve sleep, relieve pain, and in some cases treat depression. · Learn about fibromyalgia. This makes coping easier. Then, take an active role in your treatment. · Think about joining a support group with others who have fibromyalgia to learn more and get support. When should you call for help? Watch closely for changes in your health, and be sure to contact your doctor if:    · You feel sad, helpless, or hopeless; lose interest in things you used to enjoy; or have other symptoms of depression.     · Your fibromyalgia symptoms get worse. Where can you learn more? Go to https://1366 Technologiespebigclix.com.[email or web address] org and sign in to your Sitefly account. Enter V003 in the [facility] box to learn more about \"Fibromyalgia: Care Instructions. \"     If you do not have an account, please click on the \"Sign Up Now\" link. Current as of: August 4, 2020               Content Version: 12.8  © 2006-2021 [employer]. Care instructions adapted under license by [facility] ([facility]). If you have questions about a medical condition or this instruction, always ask your healthcare professional. [address] any warranty or liability for your use of this information. Patient Education        amitriptyline  Pronunciation:  a olena TRIP ti krystyna  Brand:  Landon  What is the most important information I should know about amitriptyline? You should not use this medicine if you have recently had a heart attack. Do not use amitriptyline if you have used an MAO inhibitor in the past 14 days, such as isocarboxazid, linezolid, methylene blue injection, phenelzine, rasagiline, selegiline, or tranylcypromine. Some young people have thoughts about suicide when first taking an antidepressant. Stay alert to changes in your mood or symptoms. Report any new or worsening symptoms to your doctor. What is amitriptyline? Amitriptyline is a tricyclic antidepressant that is used to treat symptoms of depression.   Amitriptyline may also be used for purposes not listed in this medication guide. What should I discuss with my healthcare provider before taking amitriptyline? You should not use amitriptyline if you are allergic to it, or:  · if you have recently had a heart attack. Do not use amitriptyline if you have used an MAO inhibitor in the past 14 days. A dangerous drug interaction could occur. MAO inhibitors include isocarboxazid, linezolid, methylene blue injection, phenelzine, rasagiline, selegiline, tranylcypromine, and others. Tell your doctor if you have used an \"SSRI\" antidepressant in the past 5 weeks, such as citalopram, escitalopram, fluoxetine (Prozac), fluvoxamine, paroxetine, sertraline (Zoloft), trazodone, or vilazodone. Tell your doctor if you have ever had:  · bipolar disorder (manic-depression) or schizophrenia;  · mental illness or psychosis;  · liver disease;  · heart disease;  · a heart attack, stroke, or seizures;  · diabetes (amitriptyline may raise or lower blood sugar);  · glaucoma; or  · problems with urination. Some young people have thoughts about suicide when first taking an antidepressant. Your doctor should check your progress at regular visits. Your family or other caregivers should also be alert to changes in your mood or symptoms. Tell your doctor if you are pregnant or breastfeeding. Amitriptyline is not approved for use by anyone younger than 15years old. How should I take amitriptyline? Follow all directions on your prescription label and read all medication guides or instruction sheets. Your doctor may occasionally change your dose. Use the medicine exactly as directed. It may take up to 4 weeks before your symptoms improve. Keep using the medication as directed and tell your doctor if your symptoms do not improve. If you need surgery, tell your surgeon you currently use this medicine. You may need to stop for a short time.   Do not stop using amitriptyline suddenly, or you could have unpleasant withdrawal symptoms. Ask your doctor how to safely stop using amitriptyline. Store at room temperature away from moisture and heat. Keep the bottle tightly closed when not in use. What happens if I miss a dose? Take the medicine as soon as you can, but skip the missed dose if it is almost time for your next dose. Do not take two doses at one time. What happens if I overdose? Seek emergency medical attention or call the Poison Help line at 1-680.133.9179. An overdose of amitriptyline can be fatal.  Overdose symptoms may include irregular heart rhythm, feeling like you might pass out, seizures, or coma. What should I avoid while taking amitriptyline? Do not drink alcohol. Dangerous side effects or death can occur when alcohol is combined with amitriptyline. Avoid driving or hazardous activity until you know how this medicine will affect you. Your reactions could be impaired. Avoid exposure to sunlight or tanning beds. Amitriptyline can make you sunburn more easily. Wear protective clothing and use sunscreen (SPF 30 or higher) when you are outdoors. What are the possible side effects of amitriptyline? Get emergency medical help if you have signs of an allergic reaction: hives; difficulty breathing; swelling of your face, lips, tongue, or throat. Report any new or worsening symptoms to your doctor, such as: mood or behavior changes, anxiety, panic attacks, trouble sleeping, or if you feel impulsive, irritable, agitated, hostile, aggressive, restless, hyperactive (mentally or physically), more depressed, or have thoughts about suicide or hurting yourself.   Call your doctor at once if you have:  · signs of a blood clot --sudden numbness or weakness, problems with vision or speech, swelling or redness in an arm or leg;  · unusual thoughts or behavior;  · a light-headed feeling, like you might pass out;  · chest pain or pressure, pain spreading to your jaw or shoulder, nausea, sweating;  · pounding heartbeats or fluttering in your chest;  · confusion, hallucinations;  · a seizure (convulsions);  · painful or difficult urination;  · severe constipation;  · easy bruising, unusual bleeding; or  · fever, chills, sore throat, mouth sores. Common side effects may include:  · constipation, diarrhea;  · nausea, vomiting, upset stomach;  · mouth pain, unusual taste, black tongue;  · appetite or weight changes;  · urinating less than usual;  · itching or rash;  · breast swelling (in men or women); or  · decreased sex drive, impotence, or difficulty having an orgasm. This is not a complete list of side effects and others may occur. Call your doctor for medical advice about side effects. You may report side effects to FDA at 2-267-XXI-7370. What other drugs will affect amitriptyline? Taking this medicine with other drugs that make you sleepy can worsen this effect. Ask your doctor before taking amitriptyline with a sleeping pill, narcotic pain medicine, muscle relaxer, or medicine for anxiety, depression, or seizures. Sometimes it is not safe to use certain medications at the same time. Some drugs can affect your blood levels of other drugs you take, which may increase side effects or make the medications less effective. Tell your doctor about all your other medicines, especially:  · other antidepressants;  · medicine to treat depression, anxiety, mood disorders, or mental illness;  · cold or allergy medicine (Benadryl and others);  · medicine to treat Parkinson's disease;  · medicine to treat stomach problems, motion sickness, or irritable bowel syndrome;  · medicine to treat overactive bladder; or  · bronchodilator asthma medication. This list is not complete. Other drugs may affect amitriptyline, including prescription and over-the-counter medicines, vitamins, and herbal products. Not all possible drug interactions are listed here. Where can I get more information?   Your pharmacist can provide more information about amitriptyline. Remember, keep this and all other medicines out of the reach of children, never share your medicines with others, and use this medication only for the indication prescribed. Every effort has been made to ensure that the information provided by [address] is accurate, up-to-date, and complete, but no guarantee is made to that effect. Drug information contained herein may be time sensitive. [facility] information has been compiled for use by healthcare practitioners and consumers in the [place] and therefore [facility] does not warrant that uses outside of the [place] are appropriate, unless specifically indicated otherwise. [facility]'s drug information does not endorse drugs, diagnose patients or recommend therapy. [facility]'s drug information is an informational resource designed to assist licensed healthcare practitioners in caring for their patients and/or to serve consumers viewing this service as a supplement to, and not a substitute for, the expertise, skill, knowledge and judgment of healthcare practitioners. The absence of a warning for a given drug or drug combination in no way should be construed to indicate that the drug or drug combination is safe, effective or appropriate for any given patient. [facility] does not assume any responsibility for any aspect of healthcare administered with the aid of information [facility] provides. The information contained herein is not intended to cover all possible uses, directions, precautions, warnings, drug interactions, allergic reactions, or adverse effects. If you have questions about the drugs you are taking, check with your doctor, nurse or pharmacist.  Copyright 2980-2214 [address]. Version: 10.01. Revision date: 6/11/2020. Care instructions adapted under license by [facility] ([facility]).  If you have questions about a medical condition or this instruction, always ask your healthcare professional. Smitha Frazier disclaims any warranty or liability for your use of this information. Patient Education        Insomnia: Care Instructions  Your Care Instructions     Insomnia is the inability to sleep well. It is a common problem for most people at some time. Insomnia may make it hard for you to get to sleep, stay asleep, or sleep as long as you need to. This can make you tired and grouchy during the day. It can also make you forgetful, less effective at work, and unhappy. Insomnia can be caused by conditions such as depression or anxiety. Pain can also affect your ability to sleep. When these problems are solved, the insomnia usually clears up. But sometimes bad sleep habits can cause insomnia. If insomnia is affecting your work or your enjoyment of life, you can take steps to improve your sleep. Follow-up care is a key part of your treatment and safety. Be sure to make and go to all appointments, and call your doctor if you are having problems. It's also a good idea to know your test results and keep a list of the medicines you take. How can you care for yourself at home? What to avoid   · Do not have drinks with caffeine, such as coffee or black tea, for 8 hours before bed. · Do not smoke or use other types of tobacco near bedtime. Nicotine is a stimulant and can keep you awake. · Avoid drinking alcohol late in the evening, because it can cause you to wake in the middle of the night. · Do not eat a big meal close to bedtime. If you are hungry, eat a light snack. · Do not drink a lot of water close to bedtime, because the need to urinate may wake you up during the night. · Do not read or watch TV in bed. Use the bed only for sleeping and sexual activity. What to try   · Go to bed at the same time every night, and wake up at the same time every morning. Do not take naps during the day. · Keep your bedroom quiet, dark, and cool. · Sleep on a comfortable pillow and mattress.   · If watching the clock makes you anxious, turn it facing away from you so you cannot see the time. · If you worry when you lie down, start a worry book. Well before bedtime, write down your worries, and then set the book and your concerns aside. · Try meditation or other relaxation techniques before you go to bed. · If you cannot fall asleep, get up and go to another room until you feel sleepy. Do something relaxing. Repeat your bedtime routine before you go to bed again. · Make your house quiet and calm about an hour before bedtime. Turn down the lights, turn off the TV, log off the computer, and turn down the volume on music. This can help you relax after a busy day. When should you call for help? Watch closely for changes in your health, and be sure to contact your doctor if:    · Your efforts to improve your sleep do not work.     · Your insomnia gets worse.     · You have been feeling down, depressed, or hopeless or have lost interest in things that you usually enjoy. Where can you learn more? Go to [email or web address]://[email or web address] org and sign in to your [employer] ([employer]) account. Enter P513 in the [employer] box to learn more about \"Insomnia: Care Instructions. \"     If you do not have an account, please click on the \"Sign Up Now\" link. Current as of: August 31, 2020               Content Version: 12.8  © 1809-0162 [employer], [employer]. Care instructions adapted under license by [facility] ([facility]). If you have questions about a medical condition or this instruction, always ask your healthcare professional. [place] [address] any warranty or liability for your use of this information. Patient Education         Sleep Problems: Getting Past Barriers to Powering Down (02:42)  Your health professional recommends that you watch this short online health video. Learn how to reduce technology use before bed for better sleep.      How to watch the video    Scan the QR code   OR Visit the website [email or web address]   Current as of: September 23, 2020               Content Version: 12.8  © 2006-2021 [employer]. Care instructions adapted under license by [facility] ([facility]). If you have questions about a medical condition or this instruction, always ask your healthcare professional. [address] any warranty or liability for your use of this information. Patient Education         Sleep Problems: Make a Plan to Power Down (02:34)  Your health professional recommends that you watch this short online health video. Take steps to reduce your technology use before bed. How to watch the video    Scan the QR code   OR Visit the website    [email or web address]   Current as of: September 23, 2020               Content Version: 12.8  © 2006-2021 [employer]. Care instructions adapted under license by [facility] ([facility]). If you have questions about a medical condition or this instruction, always ask your healthcare professional. [address] any warranty or liability for your use of this information.

## 2021-06-10 LAB — PROGESTERONE LEVEL: <0.05 NG/ML

## 2021-06-11 LAB
THYROGLOBULIN ANTIBODY: <12 IU/ML (ref 0–40)
THYROID PEROXIDASE (TPO) ABS: <4 IU/ML (ref 0–25)

## 2021-06-12 LAB
ESTRADIOL LEVEL: 5.3 PG/ML
ESTROGEN TOTAL: 20.4 PG/ML
ESTRONE: 15.1 PG/ML

## 2021-06-24 ASSESSMENT — ENCOUNTER SYMPTOMS
ABDOMINAL PAIN: 1
SORE THROAT: 0
BLOOD IN STOOL: 0
NAUSEA: 1
COUGH: 0
WHEEZING: 0
BACK PAIN: 0
DIARRHEA: 0
VOMITING: 0
CONSTIPATION: 0

## 2021-06-24 NOTE — PROGRESS NOTES
disorder        -     After the above explanation, PCP recommended trial of medication to promote/maintain optimal sleep  -     Trial amitriptyline (ELAVIL) 25 MG tablet; Take 1/2 tablet by mouth nightly    Fatigue, unspecified type: Differential diagnosis includes hormonal dysfunction versus fibromyalgia  -     Anti-Thyroglobulin Antibody; Future  -     Follicle Stimulating Hormone; Future  -     Luteinizing Hormone; Future  -     TSH without Reflex; Future  -     T4; Future  -     T4, Free; Future  -     Thyroid Peroxidase Antibody; Future  -     ESTROGENS, FRACTIONATED; Future  -     PROGESTERONE; Future  -     amitriptyline (ELAVIL) 25 MG tablet; Take 1 tablet by mouth nightly    Night sweats  -     Anti-Thyroglobulin Antibody; Future  -     Follicle Stimulating Hormone; Future  -     Luteinizing Hormone; Future  -     TSH without Reflex; Future  -     T4; Future  -     T4, Free; Future  -     Thyroid Peroxidase Antibody; Future  -     ESTROGENS, FRACTIONATED;  Future  -     PROGESTERONE; Future    Lightheadedness: differential diagnosis may include fibromyalgia, inadequate nutrition and inadequate hydration        -     Plan of care as above for fibromyalgia        -     Patient counseled about the importance of adequate nutrition, adequate hydration, optimal sleep    Episodic paroxysmal hemicrania, not intractable: differential diagnosis may include fibromyalgia, inadequate nutrition and inadequate hydration        -     Plan of care as above for fibromyalgia        -     Patient counseled about the importance of adequate nutrition, adequate hydration, optimal sleep    Pancytopenia ([facility] [address]): stable    SLE (systemic lupus erythematosus related syndrome) [facility]): Under care of [facility]    Empty sella syndrome ([facility] [address]    Idiopathic thrombocythemia ([facility] [address]): Stable    Psychophysiological insomnia        -     Plan of care as above for fibromyalgia        -     Patient counseled about the importance of adequate nutrition, adequate hydration, optimal sleep        -     amitriptyline (ELAVIL) 25 MG tablet; Take 1 tablet by mouth nightly    Follow Up:  Return in about 3 weeks (around 2021)      CURRENT STATUS (21):  Amitriptyline 12.5 mg QHS. Does well to promote sleep and she feels better rested. Mild relief of pain symptoms. She is hydrating better. Discussed modifying POC to include increased amitriptyline dose to 25 mg QHS. She is is agreement. Agreement that, if 25 mg is too sedating, to return to 1/2 tablet QHS (12.5 mg). [address]:  Patient's past medical, surgical, social and/or family history reviewed, updated in chart, and are non-contributory (unless otherwise stated). Medications and allergies also reviewed and updated in chart. Review of Systems  Review of Systems   Unable to perform ROS: Other   Constitutional: Positive for fatigue. HENT: Positive for congestion and postnasal drip. Negative for sore throat. URI symptoms x 4 weeks   Respiratory: Positive for shortness of breath (especially with exertion. Gets some relief with () albuterol via nebulizer). Negative for cough and wheezing. Cardiovascular: Positive for chest pain (common with her SLE). Negative for palpitations and leg swelling. Gastrointestinal: Positive for abdominal pain (feels \"fullness\" in her pelvis; S/P hysterectomy 2015 with ovaries intac) and nausea. Negative for blood in stool, constipation, diarrhea and vomiting. Endocrine:        Profuse nighttime sweating   Genitourinary: Negative for dysuria, frequency, hematuria and urgency. Musculoskeletal: Negative for back pain, myalgias and neck pain. Skin: Negative for rash. Neurological: Positive for light-headedness, numbness and headaches. Negative for dizziness and weakness. Psychiatric/Behavioral: Positive for dysphoric mood and sleep disturbance. The patient is nervous/anxious.          Frequently stressed       Physical Exam:    No flowsheet data found. Physical Exam  Constitutional:       General: She is not in acute distress. Appearance: Normal appearance. She is well-developed and normal weight. She is not diaphoretic. HENT:      Head: Normocephalic and atraumatic. Nose: Nose normal.      Mouth/Throat:      Mouth: Mucous membranes are moist.      Pharynx: No oropharyngeal exudate. Eyes:      General: No scleral icterus. Right eye: No discharge. Extraocular Movements: Extraocular movements intact. Conjunctiva/sclera: Conjunctivae normal.      Pupils: Pupils are equal, round, and reactive to light. Neck:      Thyroid: No thyromegaly. Cardiovascular:      Rate and Rhythm: Normal rate and regular rhythm. Heart sounds: Normal heart sounds. No murmur heard. Pulmonary:      Effort: Pulmonary effort is normal.   Chest:      Chest wall: No tenderness. Abdominal:      General: Bowel sounds are normal. There is no distension. Palpations: Abdomen is soft. There is no mass. Tenderness: There is no abdominal tenderness. There is no guarding. Musculoskeletal:         General: Normal range of motion. Cervical back: Normal range of motion and neck supple. No tenderness. Thoracic back: Tenderness present. Lumbar back: Tenderness present. Lymphadenopathy:      Cervical: No cervical adenopathy. Skin:     General: Skin is warm and dry. Coloration: Skin is not pale. Findings: No erythema or rash. Neurological:      Mental Status: She is alert and oriented to person, place, and time. Psychiatric:         Attention and Perception: Attention and perception normal.         Mood and Affect: Mood and affect normal.         Speech: Speech normal.         Behavior: Behavior normal. Behavior is cooperative. Thought Content:  Thought content normal.         Cognition and Memory: Cognition normal.         Labs:  Lab Results   Component Value Date     09/16/2019    K 3.4 09/16/2019     09/16/2019    CO2 27 09/16/2019    BUN 7 11/19/2019    CREATININE 0.9 11/19/2019    PROT 8.3 10/11/2019    LABALBU 3.6 10/11/2019    LABALBU 4.1 05/17/2012    CALCIUM 8.9 09/16/2019    GFRAA >60 11/19/2019    LABGLOM >60 11/19/2019    GLUCOSE 77 09/16/2019    GLUCOSE 79 05/17/2012    AST 19 09/16/2019    ALT 9 09/16/2019    ALKPHOS 65 09/16/2019    BILITOT 0.6 09/16/2019    TSH 1.520 06/08/2021    CHOL 183 09/16/2019    TRIG 83 09/16/2019    HDL 42 09/16/2019    LDLCALC 124 09/16/2019        Lab Results   Component Value Date    CHOL 183 09/16/2019    CHOL 174 08/08/2018    CHOL 170 06/20/2015     Lab Results   Component Value Date    TRIG 83 09/16/2019    TRIG 128 08/08/2018    TRIG 75 06/20/2015     Lab Results   Component Value Date    HDL 42 09/16/2019    HDL 42 08/08/2018    HDL 46 06/20/2015     Lab Results   Component Value Date    LDLCALC 124 (H) 09/16/2019    LDLCALC 106 (H) 08/08/2018    LDLCALC 109 (H) 06/20/2015       No results found for: LABA1C  Lab Results   Component Value Date    LDLCALC 124 (H) 09/16/2019    CREATININE 0.9 11/19/2019         Assessment / Plan:      Rob Duval was seen today for follow-up. Diagnoses and all orders for this visit:    Fibromyalgia: Mild improvement of symptoms        -     Attempt to increase amitriptyline to 25 mg 1 full tablet @ HS        -     If shed is too sedated with dosage increase, will decrease back to 12.5 mg (1/2 tablet) QHS        -     She is encouraged to continue optimizing lifestyle modifications    Fatigue, unspecified type    Acute sinusitis, recurrence not specified, unspecified location  -     azithromycin (ZITHROMAX) 250 MG tablet; Take 2 tabs (500 mg) on Day 1, and take 1 tab (250 mg) on days 2 through 5. Dyspnea on exertion: Differential diagnosis may include underlying lung disease  -     albuterol sulfate HFA (VENTOLIN HFA) 108 (90 Base) MCG/ACT inhaler;  Inhale 2 puffs into the lungs 4 times daily as needed for Wheezing  -     Spacer/Aero-Holding Johnie Blight; 1 Device by Does not apply route daily as needed (dyspnea on exertion)  -     Patient was advised to keep a log of how often she is using her inhaler. She was advised that, if she is exceeding use of > 3 times a week over the next 3-4 weeks, PFTs will be ordered. -     She will get a CXR from her rheumatologist next week        Call or go to ED immediately if symptoms worsen or persist.    Follow Up:  Return F/U week of 7/19/21 (F2F0 to assess response of breathing to new inhaler prescription. or sooner if necessary. Educational materials (inhaler/spacer use) printed for patient's review and were included in patient instructions on his/her AfterVisit Summary and given to patient at the end of visit. Counseled regarding above diagnosis,including possible risks and complications,  especially if left uncontrolled. Counseled regarding the possible side effects, risks, benefits and alternatives to treatment; patient and/or guardian verbalizes understanding, agrees, feels comfortable with and wishes to proceed with above treatment plan. Advised patient tocall with any new medication issues, and read all Rx info from pharmacy to assureaware of all possible risks and side effects of medication before taking. Reviewed age and gender appropriate health screening exams and vaccinations. Advisedpatient regarding importance of keeping up with recommended health maintenance andto schedule as soon as possible if overdue, as this is important in assessing forundiagnosed pathology, especially cancer, as well as protecting against potentially harmful/life threatening disease. Patient and/or guardian verbalizes understandingand agrees with above counseling, assessment and plan. All questions answered.     Myriam Rivers MD

## 2021-06-25 ENCOUNTER — VIRTUAL VISIT (OUTPATIENT)
Dept: FAMILY MEDICINE CLINIC | Age: 46
End: 2021-06-25
Payer: MEDICARE

## 2021-06-25 DIAGNOSIS — R06.09 DYSPNEA ON EXERTION: ICD-10-CM

## 2021-06-25 DIAGNOSIS — M79.7 FIBROMYALGIA: Primary | ICD-10-CM

## 2021-06-25 DIAGNOSIS — J01.90 ACUTE SINUSITIS, RECURRENCE NOT SPECIFIED, UNSPECIFIED LOCATION: ICD-10-CM

## 2021-06-25 DIAGNOSIS — R53.83 FATIGUE, UNSPECIFIED TYPE: ICD-10-CM

## 2021-06-25 PROCEDURE — G8419 CALC BMI OUT NRM PARAM NOF/U: HCPCS | Performed by: FAMILY MEDICINE

## 2021-06-25 PROCEDURE — G8427 DOCREV CUR MEDS BY ELIG CLIN: HCPCS | Performed by: FAMILY MEDICINE

## 2021-06-25 PROCEDURE — 99214 OFFICE O/P EST MOD 30 MIN: CPT | Performed by: FAMILY MEDICINE

## 2021-06-25 PROCEDURE — 1036F TOBACCO NON-USER: CPT | Performed by: FAMILY MEDICINE

## 2021-06-25 RX ORDER — ALBUTEROL SULFATE 90 UG/1
2 AEROSOL, METERED RESPIRATORY (INHALATION) 4 TIMES DAILY PRN
Qty: 1 INHALER | Refills: 5 | Status: SHIPPED
Start: 2021-06-25 | End: 2021-12-16

## 2021-06-25 RX ORDER — AZITHROMYCIN 250 MG/1
TABLET, FILM COATED ORAL
Qty: 1 PACKET | Refills: 0 | Status: SHIPPED
Start: 2021-06-25 | End: 2021-07-20 | Stop reason: SDUPTHER

## 2021-06-25 ASSESSMENT — ENCOUNTER SYMPTOMS: SHORTNESS OF BREATH: 1

## 2021-06-25 NOTE — PATIENT INSTRUCTIONS
feeling, seizure, feeling light-headed or fainting. What should I avoid while using albuterol inhalation? Rinse with water if this medicine gets in your eyes. What are the possible side effects of albuterol inhalation? Get emergency medical help if you have signs of an allergic reaction: hives; difficult breathing; swelling of your face, lips, tongue, or throat. Call your doctor at once if you have:  · wheezing, choking, or other breathing problems after using this medicine;  · chest pain, fast heart rate, pounding heartbeats or fluttering in your chest;  · severe headache, pounding in your neck or ears;  · pain or burning when you urinate;  · high blood sugar --increased thirst, increased urination, dry mouth, fruity breath odor; or  · low potassium --leg cramps, constipation, irregular heartbeats, increased thirst or urination, numbness or tingling, muscle weakness or limp feeling. Common side effects may include:  · chest pain, fast or pounding heartbeats;  · upset stomach, vomiting;  · painful urination;  · dizziness;  · feeling shaky or nervous;  · headache, back pain, body aches; or  · cough, sore throat, sinus pain, runny or stuffy nose. This is not a complete list of side effects and others may occur. Call your doctor for medical advice about side effects. You may report side effects to FDA at 4-019-FDA-3395. What other drugs will affect albuterol inhalation? Tell your doctor about all your other medicines, especially:  · any other inhaled medicines or bronchodilators;  · digoxin;  · a diuretic or \"water pill\";  · an antidepressant --amitriptyline, desipramine, imipramine, doxepin, nortriptyline, and others;  · a beta blocker --atenolol, carvedilol, labetalol, metoprolol, propranolol, sotalol, and others; or  · an MAO inhibitor --isocarboxazid, linezolid, methylene blue injection, phenelzine, rasagiline, selegiline, tranylcypromine, and others. This list is not complete.  Other drugs may affect albuterol inhalation, including prescription and over-the-counter medicines, vitamins, and herbal products. Not all possible drug interactions are listed here. Where can I get more information? Your pharmacist can provide more information about albuterol inhalation. Remember, keep this and all other medicines out of the reach of children, never share your medicines with others, and use this medication only for the indication prescribed. Every effort has been made to ensure that the information provided by [address] [place] [address]  is accurate, up-to-date, and complete, but no guarantee is made to that effect. Drug information contained herein may be time sensitive. [facility] information has been compiled for use by healthcare practitioners and consumers in the [place] and therefore [facility] does not warrant that uses outside of the [place] are appropriate, unless specifically indicated otherwise. [facility]'s drug information does not endorse drugs, diagnose patients or recommend therapy. [facility] drug information is an informational resource designed to assist licensed healthcare practitioners in caring for their patients and/or to serve consumers viewing this service as a supplement to, and not a substitute for, the expertise, skill, knowledge and judgment of healthcare practitioners. The absence of a warning for a given drug or drug combination in no way should be construed to indicate that the drug or drug combination is safe, effective or appropriate for any given patient. [facility] does not assume any responsibility for any aspect of healthcare administered with the aid of information [facility] provides. The information contained herein is not intended to cover all possible uses, directions, precautions, warnings, drug interactions, allergic reactions, or adverse effects.  If you have questions about the drugs you are taking, check with your doctor, nurse or pharmacist.  Copyright 5578-6185 Cruz know how many puffs you can take, you can replace the inhaler before you run out. Ask your health care provider how you can keep track of how much medicine is left. · Talk to your health care provider about using a spacer with your inhaler. Spacers make it easier to get the medicine into your lungs. You may need a spacer if you are using corticosteroid medicines. A spacer can also help if you have problems pressing the inhaler and breathing in at the same time. · If you are using a corticosteroid inhaler, gargle and rinse out your mouth with water after use. Do not swallow the water. Swallowing the water will increase the chance that the medicine will get into your bloodstream. This may make it more likely that you will have side effects. To use a spacer with an inhaler  1. Shake the inhaler. Remove the inhaler cap, and place the mouthpiece of the inhaler into the spacer. Check the inhaler instructions to see if you need to prime your inhaler before you use it. If it needs priming, follow the instructions on how to prime your inhaler. 2. Remove the cap from the spacer. 3. Hold the inhaler upright with the mouthpiece at the bottom. 4. Tilt your head back a little, and breathe out slowly and completely. 5. Place the spacer's mouthpiece in your mouth. 6. Press down on the inhaler to spray one puff of medicine into the spacer, and then start breathing in slowly. Wait to inhale until after you have pressed down on the inhaler. Some spacers have a whistle. If you hear it, you should breathe in more slowly. 7. Hold your breath for 10 seconds. This will let the medicine settle in your lungs. 8. If you need to take a second dose, wait 30 to 60 seconds to allow the inhaler valve to refill. To use an inhaler without a spacer  1. Shake the inhaler as directed. Remove the cap. Check the instructions to see if you need to prime your inhaler before you use it.  If it needs priming, follow the instructions on how to prime your inhaler. 2. Hold the inhaler upright with the mouthpiece at the bottom. 3. Tilt your head back a little, and breathe out slowly and completely. 4. Position the inhaler in one of two ways:  ? You can place the inhaler in your mouth. This is easier for most people. And it lowers the risk that any of the medicine will get into your eyes. ? Or you can place the inhaler 1 to 2 inches in front of your open mouth, without closing your lips over it. Try to open your mouth as wide as you can. Placing the inhaler in front of your open mouth may be better for getting the medicine into your lungs. But some people may find this too hard to do. 5. Start taking slow, even breaths through your mouth. Press down on the inhaler once, then inhale fully. 6. Hold your breath for 10 seconds. This will let the medicine settle in your lungs. 7. If you need to take a second dose, wait 30 to 60 seconds to allow the inhaler valve to refill. Where can you learn more? Go to https://[employer]. org and sign in to your [employer] account. Enter K111 in the [facility] box to learn more about \"Using a Metered-Dose Inhaler: Care Instructions. \"     If you do not have an account, please click on the \"Sign Up Now\" link. Current as of: October 26, 2020               Content Version: 12.9  © 2006-2021 [employer]. Care instructions adapted under license by [facility] ([facility]). If you have questions about a medical condition or this instruction, always ask your healthcare professional. [place] [address] any warranty or liability for your use of this information. Patient Education        Learning About Using Inhalers Correctly  Why is an inhaler used? An inhaler is used to send medicine right to your lungs. It's often used for asthma, COPD (chronic obstructive pulmonary disease), and other lung diseases that make it hard to breathe.   Using an inhaler:  · Sends most of the medicine straight to your lungs. · Provides a measured dose of the medicine. · Can help keep your symptoms under control. · Can limit long-term damage to your lungs. · Can be safer than if you took a pill or liquid medicine. · Works just as well, is easier to carry, and is faster to use than a nebulizer machine. What devices can you use? Different types of devices can send inhaled medicine straight to your lungs. They include metered-dose inhalers and dry powder inhalers. You may need to use more than one type of device. How can you use it correctly? Each kind of inhaler is used differently. Make sure to save the 's instructions. Don't throw them away. Pay attention to the instructions for your specific inhaler. Look for directions on:  · Whether to prime or shake it. · Whether to use a spacer or mask. · Whether you have to load medicine into the inhaler. · How to use the inhaler to deliver the medicine. This includes how to hold it, when to breathe, and how long to hold your breath. · How to know how many doses are left. · How to clean it. · How to store it. · When to throw it away. Why is it important to use it correctly? Correct use of an inhaler:  · Makes sure that the lungs get the full dose. This can mean fewer symptoms and better treatment. · Leads to fewer side effects. Sometimes people have side effects if the medicine hits the inside of the mouth instead of going into the lungs. · Saves money. Ask your doctor, nurse, pharmacist, or respiratory therapist to show you how to use the inhaler. They might ask you to show them how you use it, so they can help fix any problems. Follow-up care is a key part of your treatment and safety. Be sure to make and go to all appointments, and call your doctor if you are having problems. It's also a good idea to know your test results and keep a list of the medicines you take. Where can you learn more?   Go to OR Visit the website    https://hwi. se/r/Fttepzajyu1vo   Current as of: October 26, 2020               Content Version: 12.9  © 2006-2021 [employer]. Care instructions adapted under license by [email or web address]. If you have questions about a medical condition or this instruction, always ask your healthcare professional. [address] any warranty or liability for your use of this information.

## 2021-07-19 ENCOUNTER — OFFICE VISIT (OUTPATIENT)
Dept: FAMILY MEDICINE CLINIC | Age: 46
End: 2021-07-19
Payer: MEDICARE

## 2021-07-19 ENCOUNTER — HOSPITAL ENCOUNTER (EMERGENCY)
Age: 46
Discharge: LEFT BEFORE DISCHARGE | End: 2021-07-19
Payer: MEDICARE

## 2021-07-19 ENCOUNTER — APPOINTMENT (OUTPATIENT)
Dept: GENERAL RADIOLOGY | Age: 46
End: 2021-07-19
Payer: MEDICARE

## 2021-07-19 VITALS
OXYGEN SATURATION: 98 % | HEIGHT: 64 IN | HEART RATE: 132 BPM | SYSTOLIC BLOOD PRESSURE: 137 MMHG | TEMPERATURE: 98 F | BODY MASS INDEX: 29.02 KG/M2 | RESPIRATION RATE: 24 BRPM | WEIGHT: 170 LBS | DIASTOLIC BLOOD PRESSURE: 87 MMHG

## 2021-07-19 VITALS
WEIGHT: 170 LBS | RESPIRATION RATE: 18 BRPM | DIASTOLIC BLOOD PRESSURE: 78 MMHG | TEMPERATURE: 98.2 F | OXYGEN SATURATION: 97 % | SYSTOLIC BLOOD PRESSURE: 120 MMHG | HEART RATE: 134 BPM | BODY MASS INDEX: 29.02 KG/M2 | HEIGHT: 64 IN

## 2021-07-19 DIAGNOSIS — R06.09 DYSPNEA ON EXERTION: Primary | ICD-10-CM

## 2021-07-19 DIAGNOSIS — R00.0 TACHYCARDIA: ICD-10-CM

## 2021-07-19 LAB
ANION GAP SERPL CALCULATED.3IONS-SCNC: 13 MMOL/L (ref 7–16)
ANISOCYTOSIS: ABNORMAL
BASOPHILS ABSOLUTE: 0.01 E9/L (ref 0–0.2)
BASOPHILS RELATIVE PERCENT: 0.1 % (ref 0–2)
BUN BLDV-MCNC: 9 MG/DL (ref 6–20)
CALCIUM SERPL-MCNC: 8.9 MG/DL (ref 8.6–10.2)
CHLORIDE BLD-SCNC: 99 MMOL/L (ref 98–107)
CO2: 23 MMOL/L (ref 22–29)
CREAT SERPL-MCNC: 0.8 MG/DL (ref 0.5–1)
EOSINOPHILS ABSOLUTE: 0.01 E9/L (ref 0.05–0.5)
EOSINOPHILS RELATIVE PERCENT: 0.1 % (ref 0–6)
GFR AFRICAN AMERICAN: >60
GFR NON-AFRICAN AMERICAN: >60 ML/MIN/1.73
GLUCOSE BLD-MCNC: 76 MG/DL (ref 74–99)
HCT VFR BLD CALC: 39.2 % (ref 34–48)
HEMOGLOBIN: 12.4 G/DL (ref 11.5–15.5)
IMMATURE GRANULOCYTES #: 0.02 E9/L
IMMATURE GRANULOCYTES %: 0.3 % (ref 0–5)
LYMPHOCYTES ABSOLUTE: 1.91 E9/L (ref 1.5–4)
LYMPHOCYTES RELATIVE PERCENT: 24.6 % (ref 20–42)
MAGNESIUM: 1.8 MG/DL (ref 1.6–2.6)
MCH RBC QN AUTO: 30 PG (ref 26–35)
MCHC RBC AUTO-ENTMCNC: 31.6 % (ref 32–34.5)
MCV RBC AUTO: 94.9 FL (ref 80–99.9)
MONOCYTES ABSOLUTE: 0.93 E9/L (ref 0.1–0.95)
MONOCYTES RELATIVE PERCENT: 12 % (ref 2–12)
NEUTROPHILS ABSOLUTE: 4.9 E9/L (ref 1.8–7.3)
NEUTROPHILS RELATIVE PERCENT: 62.9 % (ref 43–80)
OVALOCYTES: ABNORMAL
PDW BLD-RTO: 15.4 FL (ref 11.5–15)
PLATELET # BLD: 116 E9/L (ref 130–450)
PMV BLD AUTO: 12.4 FL (ref 7–12)
POIKILOCYTES: ABNORMAL
POLYCHROMASIA: ABNORMAL
POTASSIUM SERPL-SCNC: 4.2 MMOL/L (ref 3.5–5)
PRO-BNP: 88 PG/ML (ref 0–125)
RBC # BLD: 4.13 E12/L (ref 3.5–5.5)
SODIUM BLD-SCNC: 135 MMOL/L (ref 132–146)
TEAR DROP CELLS: ABNORMAL
TROPONIN, HIGH SENSITIVITY: <6 NG/L (ref 0–9)
WBC # BLD: 7.8 E9/L (ref 4.5–11.5)

## 2021-07-19 PROCEDURE — 71046 X-RAY EXAM CHEST 2 VIEWS: CPT

## 2021-07-19 PROCEDURE — 4500000002 HC ER NO CHARGE

## 2021-07-19 PROCEDURE — G2212 PROLONG OUTPT/OFFICE VIS: HCPCS | Performed by: FAMILY MEDICINE

## 2021-07-19 PROCEDURE — G8419 CALC BMI OUT NRM PARAM NOF/U: HCPCS | Performed by: FAMILY MEDICINE

## 2021-07-19 PROCEDURE — 99215 OFFICE O/P EST HI 40 MIN: CPT | Performed by: FAMILY MEDICINE

## 2021-07-19 PROCEDURE — 83880 ASSAY OF NATRIURETIC PEPTIDE: CPT

## 2021-07-19 PROCEDURE — 80048 BASIC METABOLIC PNL TOTAL CA: CPT

## 2021-07-19 PROCEDURE — 85025 COMPLETE CBC W/AUTO DIFF WBC: CPT

## 2021-07-19 PROCEDURE — 84484 ASSAY OF TROPONIN QUANT: CPT

## 2021-07-19 PROCEDURE — 1036F TOBACCO NON-USER: CPT | Performed by: FAMILY MEDICINE

## 2021-07-19 PROCEDURE — 93000 ELECTROCARDIOGRAM COMPLETE: CPT | Performed by: FAMILY MEDICINE

## 2021-07-19 PROCEDURE — 93005 ELECTROCARDIOGRAM TRACING: CPT | Performed by: NURSE PRACTITIONER

## 2021-07-19 PROCEDURE — 83735 ASSAY OF MAGNESIUM: CPT

## 2021-07-19 PROCEDURE — G8427 DOCREV CUR MEDS BY ELIG CLIN: HCPCS | Performed by: FAMILY MEDICINE

## 2021-07-19 ASSESSMENT — ENCOUNTER SYMPTOMS
SHORTNESS OF BREATH: 1
RHINORRHEA: 0
ABDOMINAL PAIN: 0
SORE THROAT: 0
TROUBLE SWALLOWING: 1
COUGH: 1
VOMITING: 0
DIARRHEA: 0

## 2021-07-19 ASSESSMENT — PAIN DESCRIPTION - LOCATION: LOCATION: CHEST

## 2021-07-19 ASSESSMENT — PAIN DESCRIPTION - PAIN TYPE: TYPE: ACUTE PAIN

## 2021-07-19 ASSESSMENT — PAIN SCALES - GENERAL: PAINLEVEL_OUTOF10: 7

## 2021-07-19 NOTE — PROGRESS NOTES
Humaira Harrell is a 55 y.o. female who presents today for     Chief Complaint   Patient presents with    Pneumonia     cant take deep breaths, coughing alot , no fever     Dyspnea on Exertion/Tachycardia:  Has had cough over last 3 months, now with right sided chest pain worse with coughing, sharp in nature. She reports shortness of breath for at least 3-4 weeks, and a \"crackling\" sensation in her chest about the same time of onset. Feels like she is hearing crackling on R side of chest too. Some shortness of breath with exertion. States she has history of pleurisy. No recent travel or immobilization. Does have history of DVT about 15 years ago. Not on anti-coagulation. Also endorsing solid food getting stuck last 2-3 days, no difficulty with liquids. No diarrhea. Some subjective fevers and chills. Chart reviewed: known history of SLE. Vaccination status of COVID-19 unknown. No history obtained regarding sick contacts    [facility]:  Patient's past medical, surgical, social and/or family history reviewed, updated in chart, and are non-contributory (unless otherwise stated). Medications and allergies also reviewed and updated in chart. Review of Systems  Review of Systems   Constitutional: Positive for chills. Negative for fever. HENT: Positive for trouble swallowing. Negative for ear pain, rhinorrhea and sore throat. Respiratory: Positive for cough and shortness of breath. Cardiovascular: Positive for chest pain (sharp, right, with coughing) and palpitations. Negative for leg swelling. Gastrointestinal: Negative for abdominal pain, diarrhea and vomiting.        Physical Exam:    VS:  /78   Pulse 134   Temp 98.2 °F (36.8 °C) (Infrared)   Resp 18   Ht 5' 4\" (1.626 m)   Wt 170 lb (77.1 kg)   LMP 03/25/2015 (Exact Date)   SpO2 97%   BMI 29.18 kg/m²     LAST WEIGHT:  Wt Readings from Last 3 Encounters:   07/19/21 170 lb (77.1 kg)   06/08/21 171 lb (77.6 kg)   01/07/20 186 lb (84.4 kg)       BMI Readings from Last 3 Encounters:   07/19/21 29.18 kg/m²   06/08/21 29.35 kg/m²   01/07/20 31.93 kg/m²       Physical Exam  Constitutional:       General: She is not in acute distress. Appearance: Normal appearance. She is well-developed. She is not diaphoretic. Comments: Dyspneic during conversation   HENT:      Head: Normocephalic and atraumatic. Right Ear: Tympanic membrane, ear canal and external ear normal.      Left Ear: Tympanic membrane, ear canal and external ear normal.      Nose: Nose normal. No congestion. Mouth/Throat:      Mouth: Mucous membranes are moist.      Pharynx: No oropharyngeal exudate. Eyes:      General: No scleral icterus. Right eye: No discharge. Extraocular Movements: Extraocular movements intact. Conjunctiva/sclera: Conjunctivae normal.      Pupils: Pupils are equal, round, and reactive to light. Neck:      Thyroid: No thyromegaly. Cardiovascular:      Rate and Rhythm: Regular rhythm. Tachycardia present. Heart sounds: Normal heart sounds. No murmur heard. Pulmonary:      Effort: Pulmonary effort is normal. No respiratory distress. Breath sounds: Decreased air movement present. No stridor. Examination of the right-middle field reveals rales. Examination of the left-middle field reveals rales. Examination of the right-lower field reveals rales. Examination of the left-lower field reveals rales. Decreased breath sounds and rales present. No wheezing. Chest:      Chest wall: No tenderness. Abdominal:      General: Bowel sounds are normal. There is no distension. Palpations: Abdomen is soft. There is no mass. Tenderness: There is no abdominal tenderness. There is no guarding. Musculoskeletal:         General: No tenderness. Normal range of motion. Cervical back: Normal range of motion and neck supple. Right lower leg: No edema. Left lower leg: No edema.    Lymphadenopathy:      Cervical: No cervical adenopathy. Skin:     General: Skin is warm and dry. Coloration: Skin is not pale. Findings: No erythema or rash. Neurological:      Mental Status: She is alert and oriented to person, place, and time. Psychiatric:         Behavior: Behavior normal.         Thought Content: Thought content normal.         Labs:  Lab Results   Component Value Date     09/16/2019    K 3.4 09/16/2019     09/16/2019    CO2 27 09/16/2019    BUN 7 11/19/2019    CREATININE 0.9 11/19/2019    PROT 8.3 10/11/2019    LABALBU 3.6 10/11/2019    LABALBU 4.1 05/17/2012    CALCIUM 8.9 09/16/2019    GFRAA >60 11/19/2019    LABGLOM >60 11/19/2019    GLUCOSE 77 09/16/2019    GLUCOSE 79 05/17/2012    AST 19 09/16/2019    ALT 9 09/16/2019    ALKPHOS 65 09/16/2019    BILITOT 0.6 09/16/2019    TSH 1.520 06/08/2021    CHOL 183 09/16/2019    TRIG 83 09/16/2019    HDL 42 09/16/2019    LDLCALC 124 09/16/2019        Lab Results   Component Value Date    CHOL 183 09/16/2019    CHOL 174 08/08/2018    CHOL 170 06/20/2015     Lab Results   Component Value Date    TRIG 83 09/16/2019    TRIG 128 08/08/2018    TRIG 75 06/20/2015     Lab Results   Component Value Date    HDL 42 09/16/2019    HDL 42 08/08/2018    HDL 46 06/20/2015     Lab Results   Component Value Date    LDLCALC 124 (H) 09/16/2019    LDLCALC 106 (H) 08/08/2018    LDLCALC 109 (H) 06/20/2015       No results found for: LABA1C  Lab Results   Component Value Date    LDLCALC 124 (H) 09/16/2019    CREATININE 0.9 11/19/2019           Assessment / Plan:      Nilda Rao was seen today for pneumonia. Diagnoses and all orders for this visit:    Dyspnea on exertion: She is actually dyspneic even at rest and when speaking simple sentences. Differential diagnosis may include PE, pneumonia, respiratory distress due to COVID ( as her vaccination status is unknown).         -     Patient and her daughter were advised that she needs emergency assess now to R/O PE        -     She was initially advised to go to [facility] by ambulance due to serious nature of her symptoms; she chose instead to go by private car to [facility]. -     She and her daughter were advised of the risks associated with this type of transport, including sudden death. She verbalizes understanding and opted to continue with private transport to [facility]. -     PCP spoke to ER staff--Dr. Cedric Boyle and apprised him of the suspected diagnosis. He will await her arrival to start workup    Tachycardia:  Differential diagnosis may include PE  -     EKG 12 lead; Future  -     EKG 12 lead  -     Plan of Care as above           Time spent in pre-visit planning, interview, exam, /education and coordination of care: 69 minutes    Follow Up:  Return F/U after ER assessment. or sooner if necessary. Call or go to ED immediately if symptoms worsen or persist.    Educational materials and/or home exercises printed for patient's review and were included in patient instructions on his/her AfterVisit Summary and given to patient at the end of visit. Counseled regarding above diagnosis,including possible risks and complications,  especially if left uncontrolled. Counseled regarding the possible side effects, risks, benefits and alternatives to treatment; patient and/or guardian verbalizes understanding, agrees, feels comfortable with and wishes to proceed with above treatment plan. Advised patient tocall with any new medication issues, and read all Rx info from pharmacy to assureaware of all possible risks and side effects of medication before taking. Reviewed age and gender appropriate health screening exams and vaccinations. Advisedpatient regarding importance of keeping up with recommended health maintenance andto schedule as soon as possible if overdue, as this is important in assessing forundiagnosed pathology, especially cancer, as well as protecting against potentially harmful/life threatening disease. Patient and/or guardian verbalizes understandingand agrees with above counseling, assessment and plan. All questions answered.     Sintia Parsons MD

## 2021-07-19 NOTE — ED NOTES
FIRST PROVIDER CONTACT ASSESSMENT NOTE                                                                                                Department of Emergency Medicine                                                      First Provider Note  21  5:42 PM EDT  NAME: Humaira Harrell  : 1975  MRN: [id number]    Chief Complaint: Pneumonia (sent from PMD for possible pulmonary embolis after dx of pneumonia)      History of Present Illness:   Humaira Harrell is a 55 y.o. female who presents to the ED for r/o PE for PCP     Focused Physical Exam:  VS:    ED Triage Vitals [21 1712]   BP Temp Temp src Pulse Resp SpO2 Height Weight   137/97 97.3 °F (36.3 °C) -- 138 24 96 % -- --        General: Alert and with mild increased work of breathing and resting tachycardia. No cyanosis. Ambulatory with a steady gait. Medical History:  has a past medical history of Fibroid tumor, Herpes, Idiopathic thrombocythemia ([facility] [address] Lupus ([facility] [address] and Raynaud disease. Surgical History:  has a past surgical history that includes Tubal ligation; Dilation and curettage of uterus; Endoscopy, colon, diagnostic; Colonoscopy; and Hysterectomy (4/15/2015). Social History:  reports that she has never smoked. She has never used smokeless tobacco. She reports that she does not drink alcohol and does not use drugs. Family History: family history includes Cancer in her maternal grandfather.     Allergies: Percocet [oxycodone-acetaminophen]     Initial Plan of Care:  Initiate Treatment-Testing, Proceed toTreatment Area When Bed Available for ED Attending/MLP to Continue Care    -------------------------------------------------END OF FIRST PROVIDER CONTACT ASSESSMENT NOTE--------------------------------------------------------  Electronically signed by LONDON Ramos CNP   DD: 21       LONDON Ramos CNP  21 1742

## 2021-07-20 ENCOUNTER — TELEPHONE (OUTPATIENT)
Dept: FAMILY MEDICINE CLINIC | Age: 46
End: 2021-07-20

## 2021-07-20 DIAGNOSIS — J18.9 PNEUMONIA DUE TO INFECTIOUS ORGANISM, UNSPECIFIED LATERALITY, UNSPECIFIED PART OF LUNG: Primary | ICD-10-CM

## 2021-07-20 LAB
EKG ATRIAL RATE: 124 BPM
EKG P AXIS: 33 DEGREES
EKG P-R INTERVAL: 154 MS
EKG Q-T INTERVAL: 310 MS
EKG QRS DURATION: 82 MS
EKG QTC CALCULATION (BAZETT): 445 MS
EKG R AXIS: 35 DEGREES
EKG T AXIS: 8 DEGREES
EKG VENTRICULAR RATE: 124 BPM

## 2021-07-20 PROCEDURE — 93010 ELECTROCARDIOGRAM REPORT: CPT | Performed by: INTERNAL MEDICINE

## 2021-07-20 RX ORDER — AZITHROMYCIN 250 MG/1
TABLET, FILM COATED ORAL
Qty: 1 PACKET | Refills: 0 | Status: SHIPPED
Start: 2021-07-20 | End: 2021-08-17

## 2021-07-20 NOTE — TELEPHONE ENCOUNTER
Please review progress note from PCP visit 7/19/21.     She was advised at that time that one of the risks of her symptoms is that, left undiagnosed and untreated, could result in death

## 2021-07-20 NOTE — TELEPHONE ENCOUNTER
Patient called and states that needs to have a ct scan done for chest, for possible blood clot. Was advised this at the er to contact office to order this as did not have this done when was at the er. States that was told that there were 11 people before her and would not get the scan done until after 11:00 last night and did not want to stay. Also was not given any antibiotics or anything, would like to have an antibiotic.

## 2021-07-28 ENCOUNTER — HOSPITAL ENCOUNTER (OUTPATIENT)
Dept: NON INVASIVE DIAGNOSTICS | Age: 46
Discharge: HOME | End: 2021-07-28
Payer: MEDICARE

## 2021-07-28 LAB
LV EF: 60 %
LVEF MODALITY: NORMAL

## 2021-07-28 PROCEDURE — 93306 TTE W/DOPPLER COMPLETE: CPT

## 2021-08-09 ASSESSMENT — ENCOUNTER SYMPTOMS
ABDOMINAL PAIN: 1
WHEEZING: 0
BACK PAIN: 0
DIARRHEA: 0
NAUSEA: 1
CONSTIPATION: 0
SORE THROAT: 0
BLOOD IN STOOL: 0
VOMITING: 0

## 2021-08-09 NOTE — PROGRESS NOTES
Ml Matos is a 55 y.o. female who presents today for     Chief Complaint   Patient presents with    Shortness of Breath     still hurts when breaths       OFFICE VISIT/EMERGENCY ROOM VISIT 7/19/21:    Dyspnea on exertion: She is actually dyspneic even at rest and when speaking simple sentences. Differential diagnosis may include PE, pneumonia, respiratory distress due to COVID ( as her vaccination status is unknown). -     Patient and her daughter were advised that she needs emergency assess now to R/O PE        -     She was initially advised to go to [facility] by ambulance due to serious nature of her symptoms; she chose instead to go by private car to [facility]. -     She and her daughter were advised of the risks associated with this type of transport, including sudden death. She verbalizes understanding and opted to continue with private transport to [facility]. -     PCP spoke to ER staff--Dr. Jan Hill and apprised him of the suspected diagnosis. He will await her arrival to start workup       Referred to ER for pneumonia/PE workup on that date. CXR result, 7/19/21:    Moderate-sized right pleural effusion.       Hazy opacity in the lung bases bilaterally which may represent pneumonia or   atelectatic changes. However, repeat CXR 7/28/21 did not demonstrate  Effusion; infiltrates interpreted as better. CURRENT STATUS (08/10/21): She reports that her pain continues to worsen and that she is still coughing up yellow to gray sputum. [address]:  Patient's past medical, surgical, social and/or family history reviewed, updated in chart, and are non-contributory (unless otherwise stated). Medications and allergies also reviewed and updated in chart. Review of Systems  Review of Systems   Unable to perform ROS: Other   Constitutional: Positive for fatigue. HENT: Negative for congestion and sore throat. Respiratory: Positive for cough and shortness of breath.  Negative for wheezing. Cardiovascular: Positive for chest pain (common with her SLE). Negative for palpitations and leg swelling. Gastrointestinal: Positive for abdominal pain (feels \"fullness\" in her pelvis; S/P hysterectomy 2015 with ovaries intac) and nausea. Negative for blood in stool, constipation, diarrhea and vomiting. Endocrine:        Profuse nighttime sweating   Genitourinary: Negative for dysuria, frequency, hematuria and urgency. Musculoskeletal: Negative for back pain, myalgias and neck pain. Skin: Negative for rash. Neurological: Positive for light-headedness, numbness and headaches. Negative for dizziness and weakness. Psychiatric/Behavioral: Positive for dysphoric mood and sleep disturbance. The patient is nervous/anxious. Frequently stressed       Physical Exam:    VS:  /82   Pulse 103   Temp 97.8 °F (36.6 °C) (Infrared)   Resp 16   Ht 5' 4\" (1.626 m)   Wt 172 lb (78 kg)   LMP 03/25/2015 (Exact Date)   SpO2 98%   BMI 29.52 kg/m²     LAST WEIGHT:  Wt Readings from Last 3 Encounters:   08/10/21 172 lb (78 kg)   07/19/21 170 lb (77.1 kg)   06/08/21 171 lb (77.6 kg)       BMI Readings from Last 3 Encounters:   08/10/21 29.52 kg/m²   07/19/21 29.18 kg/m²   06/08/21 29.35 kg/m²       Physical Exam  Constitutional:       General: She is in acute distress. Appearance: She is well-developed. She is ill-appearing and toxic-appearing. She is not diaphoretic. Comments: Patient is just miserable   HENT:      Head: Normocephalic and atraumatic. Right Ear: External ear normal.      Left Ear: External ear normal.      Mouth/Throat:      Pharynx: No oropharyngeal exudate. Eyes:      General: No scleral icterus. Right eye: No discharge. Conjunctiva/sclera: Conjunctivae normal.      Pupils: Pupils are equal, round, and reactive to light. Neck:      Thyroid: No thyromegaly. Cardiovascular:      Rate and Rhythm: Regular rhythm. Tachycardia present.       Heart sounds: Normal heart sounds. No murmur heard. Pulmonary:      Effort: No respiratory distress. Breath sounds: Decreased air movement present. No stridor. Decreased breath sounds (throughout) present. No wheezing or rales. Chest:      Chest wall: Tenderness present. Abdominal:      General: Bowel sounds are normal. There is no distension. Palpations: Abdomen is soft. There is no mass. Tenderness: There is no abdominal tenderness. There is no guarding. Musculoskeletal:         General: No tenderness. Normal range of motion. Cervical back: Normal range of motion and neck supple. Lymphadenopathy:      Cervical: No cervical adenopathy. Skin:     General: Skin is warm and dry. Coloration: Skin is not pale. Findings: No erythema or rash. Neurological:      Mental Status: She is alert and oriented to person, place, and time. Psychiatric:         Behavior: Behavior normal. Behavior is cooperative. Thought Content:  Thought content normal.         Labs:  Lab Results   Component Value Date     07/19/2021    K 4.2 07/19/2021    CL 99 07/19/2021    CO2 23 07/19/2021    BUN 9 07/19/2021    CREATININE 0.8 07/19/2021    PROT 8.3 10/11/2019    LABALBU 3.6 10/11/2019    LABALBU 4.1 05/17/2012    CALCIUM 8.9 07/19/2021    GFRAA >60 07/19/2021    LABGLOM >60 07/19/2021    GLUCOSE 76 07/19/2021    GLUCOSE 79 05/17/2012    AST 19 09/16/2019    ALT 9 09/16/2019    ALKPHOS 65 09/16/2019    BILITOT 0.6 09/16/2019    TSH 1.520 06/08/2021    CHOL 183 09/16/2019    TRIG 83 09/16/2019    HDL 42 09/16/2019    LDLCALC 124 09/16/2019        Lab Results   Component Value Date    CHOL 183 09/16/2019    CHOL 174 08/08/2018    CHOL 170 06/20/2015     Lab Results   Component Value Date    TRIG 83 09/16/2019    TRIG 128 08/08/2018    TRIG 75 06/20/2015     Lab Results   Component Value Date    HDL 42 09/16/2019    HDL 42 08/08/2018    HDL 46 06/20/2015     Lab Results   Component Value Date LDLCALC 124 (H) 09/16/2019    LDLCALC 106 (H) 08/08/2018    LDLCALC 109 (H) 06/20/2015       No results found for: LABA1C  Lab Results   Component Value Date    LDLCALC 124 (H) 09/16/2019    CREATININE 0.8 07/19/2021           Assessment / Plan:      Fran Coelho was seen today for shortness of breath. Diagnoses and all orders for this visit:    Pleural effusion: Etiology unclear, but patient is clinically in moderate distress  -     ketorolac (TORADOL) injection 30 mg        -     ketorolac (TORADOL) injection 30 mg  -     ketorolac (TORADOL) 10 MG tablet; Take 1 tablet by mouth every 6 hours as needed for Pain  -     amoxicillin-clavulanate (AUGMENTIN) 875-125 MG per tablet; Take 1 tablet by mouth 2 times daily for 14 days  -     STAT CT CHEST WO CONTRAST; Future  -     [facility], DO, Pulmonary, [place] (following review of CT scan results)    Dyspnea and respiratory abnormalities: Etiology unclear, but patient is clinically in moderate distress  -     ketorolac (TORADOL) injection 30 mg        -     ketorolac (TORADOL) injection 30 mg  -     ketorolac (TORADOL) 10 MG tablet; Take 1 tablet by mouth every 6 hours as needed for Pain  -     amoxicillin-clavulanate (AUGMENTIN) 875-125 MG per tablet; Take 1 tablet by mouth 2 times daily for 14 days  -     STAT CT CHEST WO CONTRAST; Future  -     [facility], DO, Pulmonary, [place] (following review of CT scan results)      Pleuritic chest pain: Etiology unclear, but patient is clinically in moderate distress  -     ketorolac (TORADOL) injection 30 mg        -     ketorolac (TORADOL) injection 30 mg  -     ketorolac (TORADOL) 10 MG tablet; Take 1 tablet by mouth every 6 hours as needed for Pain  -     amoxicillin-clavulanate (AUGMENTIN) 875-125 MG per tablet; Take 1 tablet by mouth 2 times daily for 14 days  -     STAT CT CHEST WO CONTRAST;  Future  -     [facility] [address], DO, Pulmonary, [place] (following review of CT scan results)      ADDENDUM, 8/10/21:     Results reviewed and PCP spoke with patient about results.     She has experienced a little relief following IM Toradol. Results provided to patient.     Plan of Care:  1) Take Augmentin and Toradol as directed. 2) Urgent referral placed to Dr. Gil Navarrete (Pulmonary) to assess for any significant underying lung disease versus malignancy.     Plan of Care reviewed with patient over phone call. She verbalizes understanding and agreement. She will keep scheduled follow up with PCP. Time spent in pre-visit planning, interview, exam, /education and coordination of care: 60 minutes    Follow Up:  Return in 1 week (on 8/17/2021), or F/U 1 week (Vv) to review results and assess response of symptoms to treatment. or sooner if necessary. Call or go to ED immediately if symptoms worsen or persist.    Educational materials and/or home exercises printed for patient's review and were included in patient instructions on his/her AfterVisit Summary and given to patient at the end of visit. Counseled regarding above diagnosis,including possible risks and complications,  especially if left uncontrolled. Counseled regarding the possible side effects, risks, benefits and alternatives to treatment; patient and/or guardian verbalizes understanding, agrees, feels comfortable with and wishes to proceed with above treatment plan. Advised patient tocall with any new medication issues, and read all Rx info from pharmacy to assureaware of all possible risks and side effects of medication before taking. Reviewed age and gender appropriate health screening exams and vaccinations. Advisedpatient regarding importance of keeping up with recommended health maintenance andto schedule as soon as possible if overdue, as this is important in assessing forundiagnosed pathology, especially cancer, as well as protecting against potentially harmful/life threatening disease.       Patient and/or guardian verbalizes understandingand agrees with above counseling, assessment and plan. All questions answered.     Lisa Quesada MD

## 2021-08-10 ENCOUNTER — OFFICE VISIT (OUTPATIENT)
Dept: FAMILY MEDICINE CLINIC | Age: 46
End: 2021-08-10
Payer: MEDICARE

## 2021-08-10 ENCOUNTER — HOSPITAL ENCOUNTER (OUTPATIENT)
Dept: CT IMAGING | Age: 46
Discharge: HOME | End: 2021-08-12
Payer: MEDICARE

## 2021-08-10 ENCOUNTER — TELEPHONE (OUTPATIENT)
Dept: FAMILY MEDICINE CLINIC | Age: 46
End: 2021-08-10

## 2021-08-10 VITALS
HEIGHT: 64 IN | SYSTOLIC BLOOD PRESSURE: 122 MMHG | DIASTOLIC BLOOD PRESSURE: 82 MMHG | RESPIRATION RATE: 16 BRPM | OXYGEN SATURATION: 98 % | TEMPERATURE: 97.8 F | BODY MASS INDEX: 29.37 KG/M2 | HEART RATE: 103 BPM | WEIGHT: 172 LBS

## 2021-08-10 DIAGNOSIS — R07.81 PLEURITIC CHEST PAIN: ICD-10-CM

## 2021-08-10 DIAGNOSIS — R06.00 DYSPNEA AND RESPIRATORY ABNORMALITIES: ICD-10-CM

## 2021-08-10 DIAGNOSIS — J90 PLEURAL EFFUSION: ICD-10-CM

## 2021-08-10 DIAGNOSIS — R06.89 DYSPNEA AND RESPIRATORY ABNORMALITIES: ICD-10-CM

## 2021-08-10 DIAGNOSIS — J90 PLEURAL EFFUSION: Primary | ICD-10-CM

## 2021-08-10 PROCEDURE — 1036F TOBACCO NON-USER: CPT | Performed by: FAMILY MEDICINE

## 2021-08-10 PROCEDURE — G8419 CALC BMI OUT NRM PARAM NOF/U: HCPCS | Performed by: FAMILY MEDICINE

## 2021-08-10 PROCEDURE — G8427 DOCREV CUR MEDS BY ELIG CLIN: HCPCS | Performed by: FAMILY MEDICINE

## 2021-08-10 PROCEDURE — 99215 OFFICE O/P EST HI 40 MIN: CPT | Performed by: FAMILY MEDICINE

## 2021-08-10 PROCEDURE — 96372 THER/PROPH/DIAG INJ SC/IM: CPT | Performed by: FAMILY MEDICINE

## 2021-08-10 PROCEDURE — 71250 CT THORAX DX C-: CPT

## 2021-08-10 RX ORDER — KETOROLAC TROMETHAMINE 30 MG/ML
30 INJECTION, SOLUTION INTRAMUSCULAR; INTRAVENOUS ONCE
Status: COMPLETED | OUTPATIENT
Start: 2021-08-10 | End: 2021-08-10

## 2021-08-10 RX ORDER — KETOROLAC TROMETHAMINE 10 MG/1
10 TABLET, FILM COATED ORAL EVERY 6 HOURS PRN
Qty: 20 TABLET | Refills: 0 | Status: SHIPPED
Start: 2021-08-10 | End: 2021-12-16

## 2021-08-10 RX ORDER — KETOROLAC TROMETHAMINE 30 MG/ML
60 INJECTION, SOLUTION INTRAMUSCULAR; INTRAVENOUS ONCE
Status: SHIPPED | OUTPATIENT
Start: 2021-08-10 | End: 2021-08-15

## 2021-08-10 RX ORDER — AMOXICILLIN AND CLAVULANATE POTASSIUM 875; 125 MG/1; MG/1
1 TABLET, FILM COATED ORAL 2 TIMES DAILY
Qty: 28 TABLET | Refills: 0 | Status: SHIPPED | OUTPATIENT
Start: 2021-08-10 | End: 2021-08-24

## 2021-08-10 RX ADMIN — KETOROLAC TROMETHAMINE 30 MG: 30 INJECTION, SOLUTION INTRAMUSCULAR; INTRAVENOUS at 12:04

## 2021-08-10 RX ADMIN — KETOROLAC TROMETHAMINE 30 MG: 30 INJECTION, SOLUTION INTRAMUSCULAR; INTRAVENOUS at 12:06

## 2021-08-10 ASSESSMENT — ENCOUNTER SYMPTOMS
SHORTNESS OF BREATH: 1
COUGH: 1

## 2021-08-10 NOTE — TELEPHONE ENCOUNTER
Got 2 phone calls regarding patient CT scan results. One from one radiology place and then from [address] scan department. Patient results from CT scan are reviewed and needs to be looked at by you and to contact the patient regarding the results. Left message on your voicemail also.

## 2021-08-10 NOTE — TELEPHONE ENCOUNTER
Results reviewed and PCP spoke with patient about results. She has experienced a little relief following IM Toradol. Results provided to patient. Plan of Care:  1) Take Augmentin and Toradol as directed. 2) Urgent referral placed to Dr. Bert Navarrete (Pulmonary) to assess for any significant underying lung disease versus malignancy. Plan of Care reviewed with patient over phone call. She verbalizes understanding and agreement. She will keep scheduled follow up with PCP.

## 2021-08-10 NOTE — Clinical Note
Please review office visit from 8/10/21 and CT scan results from 8/10/21. Very unclear what is taking place with her. I am treating her for pneumonia at this time with close follow up; however, I am concerned that there may be something else going on.

## 2021-08-10 NOTE — TELEPHONE ENCOUNTER
Brando Patel, from [address] department phoned stating that attempted to do the contrast for CT but failed to find a vein and is it okay to do the CT without contrast?    Contacted Dr Low Castillo about this and per Dr Low Castillo it is okay to perform the CT scan without the contrast.  Brando Patel, informed of this and voiced understanding.

## 2021-08-12 ENCOUNTER — OFFICE VISIT (OUTPATIENT)
Dept: PULMONOLOGY | Age: 46
End: 2021-08-12
Payer: MEDICARE

## 2021-08-12 VITALS
BODY MASS INDEX: 28.68 KG/M2 | RESPIRATION RATE: 18 BRPM | OXYGEN SATURATION: 98 % | DIASTOLIC BLOOD PRESSURE: 80 MMHG | WEIGHT: 168 LBS | HEART RATE: 110 BPM | SYSTOLIC BLOOD PRESSURE: 128 MMHG | HEIGHT: 64 IN

## 2021-08-12 DIAGNOSIS — J84.89 NSIP (NONSPECIFIC INTERSTITIAL PNEUMONIA) (HCC): ICD-10-CM

## 2021-08-12 DIAGNOSIS — M32.9 SLE (SYSTEMIC LUPUS ERYTHEMATOSUS RELATED SYNDROME) (HCC): Primary | ICD-10-CM

## 2021-08-12 DIAGNOSIS — R07.89 CHEST WALL PAIN: ICD-10-CM

## 2021-08-12 LAB
DLCO %PRED: 9 %
DLCO PRED: 24.26 ML/MIN/MMHG
DLCO/VA %PRED: 26 %
DLCO/VA PRED: 4.8 ML/MIN/MMHG
DLCO/VA: 1.27 ML/MIN/MMHG
DLCO: 2.35 ML/MIN/MMHG
EXPIRATORY TIME-POST: NORMAL
EXPIRATORY TIME: NORMAL
FEF 25-75% %CHNG: NORMAL
FEF 25-75% %PRED-POST: NORMAL
FEF 25-75% %PRED-PRE: NORMAL
FEF 25-75% PRED: NORMAL
FEF 25-75%-POST: NORMAL
FEF 25-75%-PRE: NORMAL
FEV1 %PRED-POST: 54 %
FEV1 %PRED-PRE: 54 %
FEV1 PRED: 2.47 L
FEV1-POST: 1.34 L
FEV1-PRE: 1.35 L
FEV1/FVC %PRED-POST: 111 %
FEV1/FVC %PRED-PRE: 104 %
FEV1/FVC PRED: 82 %
FEV1/FVC-POST: 91 %
FEV1/FVC-PRE: 86 %
FVC %PRED-POST: 48 L
FVC %PRED-PRE: 51 %
FVC PRED: 3.04 L
FVC-POST: 1.47 L
FVC-PRE: 1.57 L
GAW %PRED: NORMAL
GAW PRED: NORMAL
GAW: NORMAL
IC %PRED: 49 %
IC PRED: 1.96 L
IC: 0.98 L
MEP: NORMAL
MIP: NORMAL
MVV %PRED-PRE: 58 %
MVV PRED: 99 L/MIN
MVV-PRE: 58 L/MIN
PEF %PRED-POST: NORMAL
PEF %PRED-PRE: NORMAL
PEF PRED: NORMAL
PEF%CHNG: NORMAL
PEF-POST: NORMAL
PEF-PRE: NORMAL
RAW %PRED: NORMAL
RAW PRED: NORMAL
RAW: NORMAL
RV %PRED: NORMAL
RV PRED: NORMAL
RV: NORMAL
SVC %PRED: 50 %
SVC PRED: 3.04 L
SVC: 1.53 L
TLC %PRED: 70 %
TLC PRED: 5.05 L
TLC: 3.56 L
VA %PRED: 36 %
VA PRED: 5.05 L
VA: 1.85 L
VTG %PRED: NORMAL
VTG PRED: NORMAL
VTG: NORMAL

## 2021-08-12 PROCEDURE — 94727 GAS DIL/WSHOT DETER LNG VOL: CPT | Performed by: INTERNAL MEDICINE

## 2021-08-12 PROCEDURE — 94060 EVALUATION OF WHEEZING: CPT | Performed by: INTERNAL MEDICINE

## 2021-08-12 PROCEDURE — 99214 OFFICE O/P EST MOD 30 MIN: CPT | Performed by: INTERNAL MEDICINE

## 2021-08-12 PROCEDURE — 1036F TOBACCO NON-USER: CPT | Performed by: INTERNAL MEDICINE

## 2021-08-12 PROCEDURE — 99203 OFFICE O/P NEW LOW 30 MIN: CPT | Performed by: INTERNAL MEDICINE

## 2021-08-12 PROCEDURE — G8427 DOCREV CUR MEDS BY ELIG CLIN: HCPCS | Performed by: INTERNAL MEDICINE

## 2021-08-12 PROCEDURE — 94726 PLETHYSMOGRAPHY LUNG VOLUMES: CPT | Performed by: INTERNAL MEDICINE

## 2021-08-12 PROCEDURE — G8419 CALC BMI OUT NRM PARAM NOF/U: HCPCS | Performed by: INTERNAL MEDICINE

## 2021-08-12 RX ORDER — PREGABALIN 50 MG/1
50 CAPSULE ORAL 3 TIMES DAILY
Qty: 30 CAPSULE | Refills: 0 | Status: SHIPPED | OUTPATIENT
Start: 2021-08-12 | End: 2021-08-17

## 2021-08-12 ASSESSMENT — PULMONARY FUNCTION TESTS
FVC_POST: 1.47
FEV1/FVC_PERCENT_PREDICTED_POST: 111
FVC_PRE: 1.57
FVC_PREDICTED: 3.04
FVC_PERCENT_PREDICTED_PRE: 51
FEV1/FVC_POST: 91
FVC_PERCENT_PREDICTED_POST: 48
FEV1/FVC_PERCENT_PREDICTED_PRE: 104
FEV1_POST: 1.34
FEV1_PREDICTED: 2.47
FEV1_PRE: 1.35
FEV1_PERCENT_PREDICTED_PRE: 54
FEV1_PERCENT_PREDICTED_POST: 54
FEV1/FVC_PRE: 86
FEV1/FVC_PREDICTED: 82

## 2021-08-12 ASSESSMENT — ENCOUNTER SYMPTOMS
ABDOMINAL PAIN: 1
SHORTNESS OF BREATH: 1
COUGH: 1
BACK PAIN: 0
VOMITING: 0
CONSTIPATION: 0
SORE THROAT: 0
BLOOD IN STOOL: 0
DIARRHEA: 0
WHEEZING: 0
NAUSEA: 1

## 2021-08-12 NOTE — LETTER
[facility] and [address] [place] [address]  Phone: [phone]  Fax: 14 Delmy Medrano DO      August 16, 2021     Patient: Maria Guadalupe Hilliard   MR Number: [id number]   YOB: 1975   Date of Visit: 8/12/2021       Dear Dr. Chang Done: Thank you for referring Elvis Badillo to me for evaluation/treatment. Below are the relevant portions of my assessment and plan of care. If you have questions, please do not hesitate to call me. I look forward to following Klaus Goodwin along with you.     Sincerely,    DO Lina Short DO    CC providers:  Lisa Quesada MD  [phone]  [facility] [facility]

## 2021-08-12 NOTE — PROGRESS NOTES
Musculoskeletal: Negative for back pain, myalgias and neck pain. Skin: Negative for rash. Neurological: Positive for light-headedness, numbness and headaches. Negative for dizziness and weakness. Psychiatric/Behavioral: Positive for dysphoric mood and sleep disturbance. The patient is nervous/anxious.          Frequently stressed           Allergies   Allergen Reactions    Percocet [Oxycodone-Acetaminophen] Swelling   ,   Past Medical History:   Diagnosis Date    Fibroid tumor     Herpes     Idiopathic thrombocythemia ([facility] [address] 9/27/2019    [facility] 2000    Raynaud disease    ,   Past Surgical History:   Procedure Laterality Date    COLONOSCOPY      DILATION AND CURETTAGE OF UTERUS      ENDOSCOPY, COLON, DIAGNOSTIC      HYSTERECTOMY  4/15/2015    bilateral salpingectomy    TUBAL LIGATION     ,   Social History     Tobacco Use    Smoking status: Never Smoker    Smokeless tobacco: Never Used   Vaping Use    Vaping Use: Never used   Substance Use Topics    Alcohol use: No     Alcohol/week: 0.0 standard drinks    Drug use: No   ,   Family History   Problem Relation Age of Onset    Cancer Maternal Grandfather    ,   Immunization History   Administered Date(s) Administered    Tdap (Boostrix, Adacel) 05/03/2016   ,   Health Maintenance   Topic Date Due    COVID-19 Vaccine (1) Never done    Cervical cancer screen  11/05/2017    Pneumococcal 0-64 years Vaccine (2 of 4 - PCV13) 03/02/2018    Annual Wellness Visit (AWV)  Never done    Colon cancer screen colonoscopy  Never done    Flu vaccine (1) 09/01/2021    Lipid screen  09/16/2024    DTaP/Tdap/Td vaccine (2 - Td or Tdap) 05/03/2026    Hepatitis C screen  Completed    HIV screen  Completed    Hepatitis A vaccine  Aged Out    Hepatitis B vaccine  Aged Out    Hib vaccine  Aged Out    Meningococcal (ACWY) vaccine  Aged Out       PHYSICAL EXAMINATION:    Patient-Reported Vitals 8/17/2021   Patient-Reported Weight -   Patient-Reported Height 5foot 4inches        Physical Exam  Constitutional:       General: She is not in acute distress. Appearance: Normal appearance. She is well-developed and normal weight. HENT:      Head: Normocephalic and atraumatic. Right Ear: External ear normal.      Left Ear: External ear normal.      Nose: Nose normal.      Mouth/Throat:      Mouth: Mucous membranes are moist.   Eyes:      Extraocular Movements: Extraocular movements intact. Conjunctiva/sclera: Conjunctivae normal.   Pulmonary:      Effort: Pulmonary effort is normal. No respiratory distress. Musculoskeletal:      Cervical back: Normal range of motion. Neurological:      Mental Status: She is alert and oriented to person, place, and time. Psychiatric:         Attention and Perception: Attention and perception normal.         Mood and Affect: Mood is depressed. Affect is blunt. Speech: Speech normal.         Behavior: Behavior is slowed. Behavior is cooperative. Thought Content: Thought content normal.         Cognition and Memory: Cognition normal.      Comments: Seems almost despondent          Assessment / Plan:      Kathleen Mcdermott was seen today for follow-up. Diagnoses and all orders for this visit:    Dyspnea and respiratory abnormalities: Mild improvement. Followed by Pulmonary    Pleuritic chest pain: Mild improvement. Followed by Pulmonary    Dyspnea on exertion: Mild improvement. Followed by Pulmonary        Follow Up:  Return if symptoms worsen or fail to improve. or sooner if necessary. Call or go to ED immediately if symptoms worsen or persist.    Educational materials and/or home exercises printed for patient's review and were included in patient instructions on his/her AfterVisit Summary and given to patient at the end of visit. Counseled regarding above diagnosis,including possible risks and complications,  especially if left uncontrolled.     Counseled regarding the possible side effects, risks, benefits and alternatives to treatment; patient and/or guardian verbalizes understanding, agrees, feels comfortable with and wishes to proceed with above treatment plan. Advised patient tocall with any new medication issues, and read all Rx info from pharmacy to assureaware of all possible risks and side effects of medication before taking. Reviewed age and gender appropriate health screening exams and vaccinations. Advised patient regarding importance of keeping up with recommended health maintenance andto schedule as soon as possible if overdue, as this is important in assessing forundiagnosed pathology, especially cancer, as well as protecting against potentially harmful/life threatening disease. Patient and/or guardian verbalizes understandingand agrees with above counseling, assessment and plan. All questions answered. Total time spent for this encounter: Not billed by time      --Santiago Spurling, MD on 8/12/2021 at 2:53 PM    An electronic signature was used to authenticate this note.

## 2021-08-12 NOTE — PATIENT INSTRUCTIONS
[facility]  [address] [facility], [address]  Office: [phone]      Your were seen in the office today for Chest/Rib pain. History of lupus. We  did make any changes to your medications today. Testing ordered today was [facility]    Please continue to follow up with your PCP and rheumatologist at the [facility]. Please do not hesitate to call the office with any questions.

## 2021-08-16 NOTE — PROGRESS NOTES
[facility]     HISTORY OF PRESENT ILLNESS:    Nasim Rosado is a 55y.o. year old female here for evaluation of abnormal CT imaging, chest wall pain. History of Lupus. She reports that she has been having chest wall pain for the last 4 months that has not resolved despite pain medication and antibiotics. She reports that this is different then her typical lupus flares. She reports that the pain is under her left breast and left scapula and that it hurts to cough and to take a deep breath. She reports that she has been treated for pleurisy before but that this pain is different. She does she rheumatology at the [address] [place] [address] but has not been able to see them in person since before the pandemic. She was also previously seen by pulmonary at the [address] [place] [address] but had not been seen since 2019 as she had not had any active respiratory symptoms. Her immune suppression regimen includes mycophenolate, plaquenil, and prednisone 10mg po tid. She reports compliance with her medication regimen. She currently denies any respiratory symptoms and denies shortness of breath, cough, dyspnea, orthopnea, or dyspnea with exertion. Her only complaint is of chest wall pain. She is understandably frustrated that her pain has been uncontrolled. She does report difficulty with the pfts due to pain with deep inspiration. She denies any fevers, chills, or night sweats. She denies cough. ALLERGIES:    Allergies   Allergen Reactions    Percocet [Oxycodone-Acetaminophen] Swelling       PAST MEDICAL HISTORY:       Diagnosis Date    Fibroid tumor     Herpes     Idiopathic thrombocythemia ([facility] [address] 9/27/2019    Lupus ([facility] [address] 2000    Raynaud disease        MEDICATIONS:   Current Outpatient Medications   Medication Sig Dispense Refill    pregabalin (LYRICA) 50 MG capsule Take 1 capsule by mouth 3 times daily for 10 days.  30 capsule 0    ketorolac (TORADOL) 10 MG tablet Take 1 tablet by mouth every 6 hours as needed for Pain 20 tablet 0    amoxicillin-clavulanate (AUGMENTIN) 875-125 MG per tablet Take 1 tablet by mouth 2 times daily for 14 days 28 tablet 0    azithromycin (ZITHROMAX) 250 MG tablet Take 2 tabs (500 mg) on Day 1, and take 1 tab (250 mg) on days 2 through 5. (Patient not taking: Reported on 8/10/2021) 1 packet 0    albuterol sulfate HFA (VENTOLIN HFA) 108 (90 Base) MCG/ACT inhaler Inhale 2 puffs into the lungs 4 times daily as needed for Wheezing 1 Inhaler 5    Spacer/Aero-Holding Chambers VERONIQUE 1 Device by Does not apply route daily as needed (dyspnea on exertion) 1 Device 0    amitriptyline (ELAVIL) 25 MG tablet Take 1 tablet by mouth nightly Take 1/2 tablet QHS (Patient not taking: Reported on 8/10/2021) 90 tablet 1    triamcinolone (KENALOG) 0.1 % ointment Apply topically 2 times daily Apply topically 2 times daily. (Patient not taking: Reported on 8/10/2021) 1 Tube 0    Mycophenolate Sodium 360 MG TBEC Take 360 mg by mouth 2 times daily      albuterol (PROVENTIL) (2.5 MG/3ML) 0.083% nebulizer solution 2.5 mg  (Patient not taking: Reported on 8/10/2021)      ARIPiprazole (ABILIFY) 10 MG tablet Take 10 mg by mouth  (Patient not taking: Reported on 8/10/2021)      naproxen (NAPROSYN) 500 MG tablet Take 500 mg by mouth      Respiratory Therapy Supplies (NEBULIZER COMPRESSOR) KIT 1 Device by Other route       hydroxychloroquine (PLAQUENIL) 200 MG tablet Take by mouth 2 times daily      LORazepam (ATIVAN) 1 MG tablet Take 1 mg by mouth 2 times daily. (Patient not taking: Reported on 8/10/2021)       No current facility-administered medications for this visit. SOCIAL AND OCCUPATIONAL HEALTH:  The patient is a Never smoker. There  is not history of TB or TB exposure. There is not asbestos or silica dust exposure. The patient reports does not have coal, foundry, quarry or [employer] exposure. Travel history reveals no travel history.   There is not  history of recreational or IV drug use. There is not hot tube exposure. The patient does not pets, dogs, cats turtles or exotic birds. SURGICAL HISTORY:   Past Surgical History:   Procedure Laterality Date    COLONOSCOPY      DILATION AND CURETTAGE OF UTERUS      ENDOSCOPY, COLON, DIAGNOSTIC      HYSTERECTOMY  4/15/2015    bilateral salpingectomy    TUBAL LIGATION         FAMILY HISTORY: No family history of cancer, blood clots     REVIEW OF SYSTEMS:  Constitutional:  No fevers, chills, night sweats  Skin:  No rashes or lesions  EENT: No change in vision, change in hearing, lymphadenopathy, difficulty swallowing  Cardiovascular:  No palpitations. + Chest pain with deep inspiration, some movements  Respiration Denies cough, denies dyspnea, denies orthopnea  Gastrointestinal: Denies n/v/d/c  Musculoskeletal: +allodynia, denies joint aches  Neurological:  Denies seizures, dizziness, headaches  Psychological: Denies anxiety, depression  Endocrine:  Denies polyuria, polydipsia, heat or cold intolerance. Hematopoietic/lymphatic: denies easy bruising        PHYSICAL EXAMINATION:  Constitutional: well nourished, well developed, no acute distress  EENT: EOMI, PERRL  Neck: No adenopathy  Respiratory:  CTA bilaterally, no crackles, rales, or wheeze  Cardiovascular:  RRR, No m/r/g  Pulses:  Equal bilaterally  Abdomen:  Soft, nontender  Lymphatic:  No lymphadenopathy  Musculoskeletal:  No joint abnormalities, no ulnar deviation of hands  Extremities:  No cyanosis, no clubbing  Skin:  No rashes   Neurological/Psychiatric: alert, oriented x 3. No focal deficits. DATA: Spirometry demonstrates an FVC of 1.57 liters which is 51% of predicted with an FEV1 of  1.35 liters which is 54% of predicted. FEV1/FVC ratio is 86%. Mid expiratory flow rates are normal at 59% of predicted. There is no significant bronchodilator response. Maximum voluntary ventilation is normal at 58 liters per minute or 58% of predicted.     Lung volumes via nitrogen washout show SVC of 1.53 liters which is 50% of predicted. RV of 2.03 which is 118% of predicted. Total lung capacity of 3.56 is 70% of predicted. Corrected DLCO is 26% of predicted. Flow volume loop shows no signs of ntrathoracic or extrathoracic obstruction. Overall Pulmonary function tests consistent with a moderate restriction with significantly reduced DLCO. There has been a significant decline since prior spirometry completed at [facility] as below. Spirometry at [facility] 2019                      Richard   LLN  Pred   ULN     %   FVC            L  2.56  2.41  3.10  3.82  82.5   FEV1              1.88  1.94  2.53  3.09  74.3   FEV1/FVC       %     73    71    82    91  89.7   PEF          L/s   5.59  4.47  6.49  8.50  86.2   FEF50%       L/s   1.76  1.92  3.74  5.56  47.0   FIF50%       L/s   2.86                           FE%FIF         %     61                           YWI84-18%    L/s   1.31  1.38  2.66  4.32  49.2   FEV6           L   2.49  2.26  2.96  3.67  84.0   NUI763%      sec   8.32                           FETPEF       sec   0.09                           VBe%FV         %      4                           VBEex          L   0.09                                             ----- ----- ----- ----- -----                     ----- ----- ----- ----- -----                     ----- ----- ----- ----- -----   DLCO_SBml/(min*mmH12.15 16.96 23.46 29.96  51.8   DLCOcSBml/(min*mmH12.64 16.96 23.46 29.96  53.9   VA_SB          L   3.46  3.99  5.09  6.19  68.0   DL/VAml/(min*mmHg*L3.51  3.37  4.69  6.00  74.9   KCOc_SBml/(min*mmHg3.65  3.37  4.69  6.00  77.9   IVC_SB         L   2.45  2.41  3.10  3.82  79.0   BHT          sec  11.50                           Hb      g(Hb)/dL  12.20             CT CHEST  There is borderline cardiomegaly with the small pericardial effusion.    Moderately enlarged lymph nodes are identified in the mediastinum with lymph   nodes measuring up to 1 cm in the AP window and paratracheal region and   larger ones in the axilla as before.  There is ectatic ascending thoracic   aorta measuring 3.9 x 3.5 cm.  Trachea and major bronchi are patent.  There   is interstitial lung disease with the honeycombing in the lower lobes   bilaterally, lingula and right middle lobe.  There is minimal ground-glass   infiltrates concerning for pneumonia.  There is small pleural effusion in the   right lower lobe with nodular pleural thickening. Eugene Gilford is of normal   architecture.                           IMPRESSION:       1. History of Lupus  2. High risk medication use  3. History of NSIP   4. Abnormal CT imaging  5. Chest wall pain           PLAN:      1. I reviewed the patients pulmonary function tests with her compared to those at the [facility]. We discussed that comparatively there was a significant drop in her lung function. However, she currently denies any respiratory symptoms only chest wall pain. We also reviewed the labs that she had had ordered by her rheumatologist in January on her phone that were available in my chart from an outside system. Non of these were inflammatory markers however. At this time I did prescribe pregabalin to try to help with the chest wall pain which may be neuropathic in origin. I also order an inflammatory panel to see how active her disease is. She is to continue the antibiotics currently prescribed and also to contact her rheumatologist. I will see her back in the office in two weeks and if her symptoms have failed to improve I would like to consider bronchoscopy to rule out any atypical or opportunistic infections. Regarding the trace pleural effusion there is likely not enough present for sampling. I hope this updates you on my evaluation and clinical thinking. Thank you for allowing me to participate in his care.      Sincerely,        Edwar Medrano.  Office: [phone]  Fax: [phone]

## 2021-08-17 ENCOUNTER — VIRTUAL VISIT (OUTPATIENT)
Dept: FAMILY MEDICINE CLINIC | Age: 46
End: 2021-08-17
Payer: MEDICARE

## 2021-08-17 DIAGNOSIS — R07.81 PLEURITIC CHEST PAIN: ICD-10-CM

## 2021-08-17 DIAGNOSIS — R06.89 DYSPNEA AND RESPIRATORY ABNORMALITIES: Primary | ICD-10-CM

## 2021-08-17 DIAGNOSIS — R06.09 DYSPNEA ON EXERTION: ICD-10-CM

## 2021-08-17 DIAGNOSIS — R06.00 DYSPNEA AND RESPIRATORY ABNORMALITIES: Primary | ICD-10-CM

## 2021-08-17 PROCEDURE — 99213 OFFICE O/P EST LOW 20 MIN: CPT | Performed by: FAMILY MEDICINE

## 2021-08-17 PROCEDURE — G8427 DOCREV CUR MEDS BY ELIG CLIN: HCPCS | Performed by: FAMILY MEDICINE

## 2021-08-24 ENCOUNTER — TELEPHONE (OUTPATIENT)
Dept: PULMONOLOGY | Age: 46
End: 2021-08-24

## 2021-08-24 NOTE — TELEPHONE ENCOUNTER
Patient called back in stating that she would like to cancel her appointment for tomorrow stating that she is having calf pain and it hurts to breath. She has not gotten the blood work done.  Dr. Aaliyah Carranza taled to patient as well telling her that if she is feeling worse she needs to go to the ER and to please keep her appointment with her Rheumatoid dr on Thursday and to call in to reschedule her appointment with Dr. Aaliyah Carranza when she is feeling better

## 2021-12-13 ENCOUNTER — OFFICE VISIT (OUTPATIENT)
Dept: FAMILY MEDICINE CLINIC | Age: 46
End: 2021-12-13
Payer: MEDICARE

## 2021-12-13 VITALS
TEMPERATURE: 97.5 F | OXYGEN SATURATION: 97 % | RESPIRATION RATE: 16 BRPM | SYSTOLIC BLOOD PRESSURE: 126 MMHG | BODY MASS INDEX: 28 KG/M2 | HEIGHT: 64 IN | WEIGHT: 164 LBS | DIASTOLIC BLOOD PRESSURE: 80 MMHG | HEART RATE: 68 BPM

## 2021-12-13 DIAGNOSIS — R09.89 SINUS SYMPTOM: ICD-10-CM

## 2021-12-13 DIAGNOSIS — E23.6 EMPTY SELLA (HCC): ICD-10-CM

## 2021-12-13 DIAGNOSIS — Z74.09 IMPAIRED FUNCTIONAL MOBILITY, BALANCE, GAIT, AND ENDURANCE: ICD-10-CM

## 2021-12-13 DIAGNOSIS — R11.0 NAUSEA: ICD-10-CM

## 2021-12-13 DIAGNOSIS — R42 DIZZINESS: Primary | ICD-10-CM

## 2021-12-13 PROCEDURE — G8427 DOCREV CUR MEDS BY ELIG CLIN: HCPCS | Performed by: FAMILY MEDICINE

## 2021-12-13 PROCEDURE — 1036F TOBACCO NON-USER: CPT | Performed by: FAMILY MEDICINE

## 2021-12-13 PROCEDURE — G8419 CALC BMI OUT NRM PARAM NOF/U: HCPCS | Performed by: FAMILY MEDICINE

## 2021-12-13 PROCEDURE — 99214 OFFICE O/P EST MOD 30 MIN: CPT | Performed by: FAMILY MEDICINE

## 2021-12-13 PROCEDURE — G8484 FLU IMMUNIZE NO ADMIN: HCPCS | Performed by: FAMILY MEDICINE

## 2021-12-13 RX ORDER — MECLIZINE HYDROCHLORIDE 25 MG/1
25 TABLET ORAL 3 TIMES DAILY PRN
Qty: 30 TABLET | Refills: 1 | Status: SHIPPED | OUTPATIENT
Start: 2021-12-13 | End: 2021-12-23

## 2021-12-13 RX ORDER — ONDANSETRON 4 MG/1
4 TABLET, FILM COATED ORAL 3 TIMES DAILY PRN
Qty: 30 TABLET | Refills: 1 | Status: SHIPPED | OUTPATIENT
Start: 2021-12-13

## 2021-12-13 RX ORDER — PREDNISONE 10 MG/1
10 TABLET ORAL DAILY
COMMUNITY

## 2021-12-13 ASSESSMENT — ENCOUNTER SYMPTOMS
VOMITING: 1
NAUSEA: 1
ABDOMINAL PAIN: 0
COUGH: 0
SORE THROAT: 0
BLOOD IN STOOL: 0
WHEEZING: 0
DIARRHEA: 0
SHORTNESS OF BREATH: 0
CONSTIPATION: 0
BACK PAIN: 0

## 2021-12-13 NOTE — PROGRESS NOTES
Robert Delgadillo is a 55 y.o. female who presents today for     Chief Complaint   Patient presents with    Dizziness     n/v, dry heaving, when stands, feels like is standing sideways, off/on but yesterday through today has gotten worse and not getting any better.  Health Maintenance     declined vaccines     Dizziness:  She is having nausea and Vomiting (frequently with dry heaves). When stands, feels like is standing sideways. She has had this off/on but yesterday (12/12/21) through today(12/13/21) has gotten worse and not getting any better. Associated symptoms: double vision, dizziness, leaning/falling to the left with transfers and ambulation. Denies dysphagia. No facial droop/dysarthria. She also does report sinus pressure, some posterior drainage. Chart reviewed; no significant history of underlying sinusitis or allergies. PMH: SLE: [facility] Chart reviewed: similar symptoms prompting MRI/MRA head and neck. Studies remarkable for partially empty sella    PMFSH:  Patient's past medical, surgical, social and/or family history reviewed, updated in chart, and are non-contributory (unless otherwise stated). Medications and allergies also reviewed and updated in chart. Review of Systems  Review of Systems   HENT: Negative for congestion, ear pain and sore throat. Respiratory: Negative for cough, shortness of breath and wheezing. Cardiovascular: Negative for chest pain, palpitations and leg swelling. Gastrointestinal: Positive for nausea and vomiting. Negative for abdominal pain, blood in stool, constipation and diarrhea. Genitourinary: Negative for dysuria, frequency, hematuria and urgency. Musculoskeletal: Negative for back pain, myalgias and neck pain. Skin: Negative for rash. Neurological: Positive for dizziness. Negative for weakness and headaches. Psychiatric/Behavioral: The patient is not nervous/anxious.         Physical Exam:    VS:  /80   Pulse 68   Temp 97.5 °F (36.4 °C) (Infrared)   Resp 16   Ht 5' 4\" (1.626 m)   Wt 164 lb (74.4 kg) Comment: approximate, did not get on scale  LMP 03/25/2015 (Exact Date)   SpO2 97%   BMI 28.15 kg/m²     LAST WEIGHT:  Wt Readings from Last 3 Encounters:   12/13/21 164 lb (74.4 kg)   08/12/21 168 lb (76.2 kg)   08/10/21 172 lb (78 kg)       BMI Readings from Last 3 Encounters:   12/13/21 28.15 kg/m²   08/12/21 28.84 kg/m²   08/10/21 29.52 kg/m²       Physical Exam  Constitutional:       General: She is not in acute distress. Appearance: She is well-developed. She is ill-appearing. She is not diaphoretic. Comments: Patient is sitting in wheelchair and is very ill-appearing. HENT:      Head: Normocephalic and atraumatic. Right Ear: External ear normal.      Left Ear: External ear normal.      Nose:      Right Sinus: Maxillary sinus tenderness and frontal sinus tenderness present. Left Sinus: Maxillary sinus tenderness and frontal sinus tenderness present. Comments: Sinuses were very tender to both palpation and percussion. Mouth/Throat:      Pharynx: No oropharyngeal exudate. Eyes:      General: No scleral icterus. Right eye: No discharge. Conjunctiva/sclera: Conjunctivae normal.      Pupils: Pupils are equal, round, and reactive to light. Neck:      Thyroid: No thyromegaly. Cardiovascular:      Rate and Rhythm: Normal rate and regular rhythm. Heart sounds: Normal heart sounds. No murmur heard. Pulmonary:      Effort: Pulmonary effort is normal. No respiratory distress. Breath sounds: No stridor. No wheezing or rales. Chest:      Chest wall: No tenderness. Abdominal:      General: Bowel sounds are normal. There is no distension. Palpations: Abdomen is soft. There is no mass. Tenderness: There is no abdominal tenderness. There is no guarding. Musculoskeletal:         General: No tenderness.  Normal range of motion. Cervical back: Normal range of motion and neck supple. Lymphadenopathy:      Cervical: No cervical adenopathy. Skin:     General: Skin is warm and dry. Coloration: Skin is not pale. Findings: No erythema or rash. Neurological:      Mental Status: She is alert and oriented to person, place, and time. Psychiatric:         Behavior: Behavior normal.         Thought Content: Thought content normal.         Labs:  Lab Results   Component Value Date     07/19/2021    K 4.2 07/19/2021    CL 99 07/19/2021    CO2 23 07/19/2021    BUN 9 07/19/2021    CREATININE 0.8 07/19/2021    PROT 8.3 10/11/2019    LABALBU 3.6 10/11/2019    LABALBU 4.1 05/17/2012    CALCIUM 8.9 07/19/2021    GFRAA >60 07/19/2021    LABGLOM >60 07/19/2021    GLUCOSE 76 07/19/2021    GLUCOSE 79 05/17/2012    AST 19 09/16/2019    ALT 9 09/16/2019    ALKPHOS 65 09/16/2019    BILITOT 0.6 09/16/2019    TSH 1.520 06/08/2021    CHOL 183 09/16/2019    TRIG 83 09/16/2019    HDL 42 09/16/2019    LDLCALC 124 09/16/2019        Lab Results   Component Value Date    CHOL 183 09/16/2019    CHOL 174 08/08/2018    CHOL 170 06/20/2015     Lab Results   Component Value Date    TRIG 83 09/16/2019    TRIG 128 08/08/2018    TRIG 75 06/20/2015     Lab Results   Component Value Date    HDL 42 09/16/2019    HDL 42 08/08/2018    HDL 46 06/20/2015     Lab Results   Component Value Date    LDLCALC 124 (H) 09/16/2019    LDLCALC 106 (H) 08/08/2018    LDLCALC 109 (H) 06/20/2015       No results found for: LABA1C  Lab Results   Component Value Date    LDLCALC 124 (H) 09/16/2019    CREATININE 0.8 07/19/2021           Assessment / Plan:      Missie Simmonds was seen today for dizziness and health maintenance.     Diagnoses and all orders for this visit:    Dizziness: Etiology unclear; she has a constellation of symptoms such as impaired functional mobility balance gait and endurance and nausea, in addition to a past history of empty sella syndrome, that warrants ridging with MRI to firm or refute the presence of any structural abnormalities. -     MRI BRAIN WO CONTRAST; Future  -     For now, meclizine (ANTIVERT) 25 MG tablet; Take 1 tablet by mouth 3 times daily as needed for Dizziness or Nausea    Sinus symptom: Differential diagnosis of her dizziness may include sinusitis. Before starting antibiotics, will confirm or refute with imaging.  -     MRI BRAIN WO CONTRAST; Future    Impaired functional mobility, balance, gait, and endurance  -     MRI BRAIN WO CONTRAST; Future    Nausea  -     MRI BRAIN WO CONTRAST; Future  -     ondansetron (ZOFRAN) 4 MG tablet; Take 1 tablet by mouth 3 times daily as needed for Nausea or Vomiting    Empty sella ([address]  -     MRI BRAIN WO CONTRAST; Future        Follow Up:  Return for F/U based on test outcome. or sooner if necessary. Call or go to ED immediately if symptoms worsen or persist.    Educational materials and/or home exercises printed for patient's review and were included in patient instructions on his/her AfterVisit Summary and given to patient at the end of visit. Counseled regarding above diagnosis,including possible risks and complications,  especially if left uncontrolled. Counseled regarding the possible side effects, risks, benefits and alternatives to treatment; patient and/or guardian verbalizes understanding, agrees, feels comfortable with and wishes to proceed with above treatment plan. Advised patient tocall with any new medication issues, and read all Rx info from pharmacy to assureaware of all possible risks and side effects of medication before taking. Reviewed age and gender appropriate health screening exams and vaccinations.   Advisedpatient regarding importance of keeping up with recommended health maintenance andto schedule as soon as possible if overdue, as this is important in assessing forundiagnosed pathology, especially cancer, as well as protecting against potentially harmful/life threatening disease. Patient and/or guardian verbalizes understandingand agrees with above counseling, assessment and plan. All questions answered.     Kika Zelaya MD on 12/13/21

## 2022-01-31 ENCOUNTER — HOSPITAL ENCOUNTER (OUTPATIENT)
Dept: GENERAL RADIOLOGY | Age: 47
Discharge: HOME | End: 2022-02-02
Payer: COMMERCIAL

## 2022-01-31 DIAGNOSIS — Z12.31 VISIT FOR SCREENING MAMMOGRAM: ICD-10-CM

## 2022-01-31 PROCEDURE — 77063 BREAST TOMOSYNTHESIS BI: CPT

## 2022-02-14 ENCOUNTER — HOSPITAL ENCOUNTER (OUTPATIENT)
Dept: MRI IMAGING | Age: 47
Discharge: HOME | End: 2022-02-16
Payer: COMMERCIAL

## 2022-02-14 DIAGNOSIS — Z74.09 IMPAIRED FUNCTIONAL MOBILITY, BALANCE, GAIT, AND ENDURANCE: ICD-10-CM

## 2022-02-14 DIAGNOSIS — R42 DIZZINESS: ICD-10-CM

## 2022-02-14 DIAGNOSIS — R11.0 NAUSEA: ICD-10-CM

## 2022-02-14 DIAGNOSIS — E23.6 EMPTY SELLA (HCC): ICD-10-CM

## 2022-02-14 DIAGNOSIS — R09.89 SINUS SYMPTOM: ICD-10-CM

## 2022-02-14 PROCEDURE — 70551 MRI BRAIN STEM W/O DYE: CPT

## 2022-05-06 ENCOUNTER — OFFICE VISIT (OUTPATIENT)
Dept: FAMILY MEDICINE CLINIC | Age: 47
End: 2022-05-06
Payer: COMMERCIAL

## 2022-05-06 VITALS
TEMPERATURE: 97.2 F | OXYGEN SATURATION: 97 % | BODY MASS INDEX: 31.24 KG/M2 | WEIGHT: 182 LBS | DIASTOLIC BLOOD PRESSURE: 86 MMHG | SYSTOLIC BLOOD PRESSURE: 136 MMHG | HEART RATE: 98 BPM

## 2022-05-06 DIAGNOSIS — L30.9 DERMATITIS: Primary | ICD-10-CM

## 2022-05-06 PROCEDURE — 99213 OFFICE O/P EST LOW 20 MIN: CPT | Performed by: FAMILY MEDICINE

## 2022-05-06 RX ORDER — ACYCLOVIR 400 MG/1
400 TABLET ORAL 2 TIMES DAILY
COMMUNITY

## 2022-05-06 ASSESSMENT — ENCOUNTER SYMPTOMS
RESPIRATORY NEGATIVE: 1
EYES NEGATIVE: 1
GASTROINTESTINAL NEGATIVE: 1
ALLERGIC/IMMUNOLOGIC NEGATIVE: 1

## 2022-05-06 NOTE — PROGRESS NOTES
OFFICE PROGRESS NOTE      SUBJECTIVE:        Patient ID:   Clement Cage is a 55 y.o. female who presents for   Chief Complaint   Patient presents with    Rash     C/o rash on upper lobes of bilateral ears x 5 days and Skin irritation resembling bite makes on bilateral arms and RT lower back x 2 days. HPI:   Patient here complaining of rash on the arms and the earlobe  She states she has had the rash for the last few days  Nobody else in the family got any rash  No fever        Prior to Visit Medications    Medication Sig Taking?  Authorizing Provider   acyclovir (ZOVIRAX) 400 MG tablet Take 400 mg by mouth 2 times daily Yes Historical Provider, MD   predniSONE (DELTASONE) 10 MG tablet Take 10 mg by mouth daily Yes Historical Provider, MD   ondansetron (ZOFRAN) 4 MG tablet Take 1 tablet by mouth 3 times daily as needed for Nausea or Vomiting Yes Amilcar Zee MD   hydroxychloroquine (PLAQUENIL) 200 MG tablet Take by mouth 2 times daily Yes Historical Provider, MD   amitriptyline (ELAVIL) 25 MG tablet Take 1 tablet by mouth nightly Take 1/2 tablet QHS  Amilcar Zee MD   Mycophenolate Sodium 360 MG TBEC Take 360 mg by mouth 2 times daily  Patient not taking: Reported on 5/6/2022  Historical Provider, MD   naproxen (NAPROSYN) 500 MG tablet Take 500 mg by mouth  Patient not taking: Reported on 5/6/2022  Historical Provider, MD      Social History     Socioeconomic History    Marital status:      Spouse name: None    Number of children: None    Years of education: None    Highest education level: None   Occupational History    None   Tobacco Use    Smoking status: Never Smoker    Smokeless tobacco: Never Used   Vaping Use    Vaping Use: Never used   Substance and Sexual Activity    Alcohol use: No     Alcohol/week: 0.0 standard drinks    Drug use: No    Sexual activity: Yes     Partners: Male   Other Topics Concern    None   Social History Narrative    None     Social Determinants of Health     Financial Resource Strain: Unknown    Difficulty of Paying Living Expenses: Patient refused   Food Insecurity: No Food Insecurity    Worried About Running Out of Food in the Last Year: Never true    Collin of Food in the Last Year: Never true   Transportation Needs:     Lack of Transportation (Medical): Not on file    Lack of Transportation (Non-Medical): Not on file   Physical Activity:     Days of Exercise per Week: Not on file    Minutes of Exercise per Session: Not on file   Stress:     Feeling of Stress : Not on file   Social Connections:     Frequency of Communication with Friends and Family: Not on file    Frequency of Social Gatherings with Friends and Family: Not on file    Attends Shinto Services: Not on file    Active Member of [address] [place] [address] [employer] or Organizations: Not on file    Attends Club or Organization Meetings: Not on file    Marital Status: Not on file   Intimate Partner Violence:     Fear of Current or Ex-Partner: Not on file    Emotionally Abused: Not on file    Physically Abused: Not on file    Sexually Abused: Not on file   Housing Stability:     Unable to Pay for Housing in the Last Year: Not on file    Number of [place] in the Last Year: Not on file    Unstable Housing in the Last Year: Not on file       I have reviewed Savanah's allergies, medications, problem list, medical, social and family history and have updated as needed in the electronic medical record  Review Of Systems:    Review of Systems   Constitutional: Negative. HENT: Negative. Eyes: Negative. Respiratory: Negative. Cardiovascular: Negative. Gastrointestinal: Negative. Endocrine: Negative. Musculoskeletal: Negative. Skin: Positive for rash. Allergic/Immunologic: Negative. Neurological: Negative. Hematological: Negative. Psychiatric/Behavioral: Negative.                OBJECTIVE:     VS:  Wt Readings from Last 3 Encounters:   05/06/22 182 lb (82.6 kg)   12/13/21 164 lb (74.4 kg)   08/12/21 168 lb (76.2 kg)     Temp Readings from Last 3 Encounters:   05/06/22 97.2 °F (36.2 °C) (Infrared)   12/13/21 97.5 °F (36.4 °C) (Infrared)   08/10/21 97.8 °F (36.6 °C) (Infrared)     BP Readings from Last 3 Encounters:   05/06/22 136/86   12/13/21 126/80   08/12/21 128/80        Physical Exam  Constitutional:       Appearance: She is well-developed. HENT:      Head: Normocephalic and atraumatic. Eyes:      Conjunctiva/sclera: Conjunctivae normal.      Pupils: Pupils are equal, round, and reactive to light. Cardiovascular:      Rate and Rhythm: Normal rate and regular rhythm. Heart sounds: Normal heart sounds. Pulmonary:      Effort: Pulmonary effort is normal.      Breath sounds: Normal breath sounds. Abdominal:      General: Bowel sounds are normal.      Palpations: Abdomen is soft. Musculoskeletal:         General: Normal range of motion. Cervical back: Normal range of motion and neck supple. Skin:     General: Skin is warm and dry. Findings: Rash present. Neurological:      Mental Status: She is alert and oriented to person, place, and time. Psychiatric:         Thought Content:  Thought content normal.            Labs :    Lab Results   Component Value Date    WBC 7.8 07/19/2021    HGB 12.4 07/19/2021    HCT 39.2 07/19/2021     (L) 07/19/2021    CHOL 183 09/16/2019    TRIG 83 09/16/2019    HDL 42 09/16/2019    ALT 9 09/16/2019    AST 19 09/16/2019     07/19/2021    K 4.2 07/19/2021    CL 99 07/19/2021    CREATININE 0.8 07/19/2021    BUN 9 07/19/2021    CO2 23 07/19/2021    TSH 1.520 06/08/2021    INR 1.1 08/08/2018     Lab Results   Component Value Date    COLORU montelongo 02/12/2018    COLORU Yellow 05/17/2017    NITRU Negative 05/17/2017    GLUCOSEU negative 02/12/2018    GLUCOSEU Negative 05/17/2017    GLUCOSEU NEGATIVE 05/17/2012    GLUCOSEU NEGATIVE 05/17/2012 KETUA negative 02/12/2018    KETUA Negative 05/17/2017    UROBILINOGEN 0.2 05/17/2017    BILIRUBINUR negative 02/12/2018    BILIRUBINUR NEGATIVE 05/17/2012    BILIRUBINUR NEGATIVE 05/17/2012     Lab Results   Component Value Date     11.5 07/13/2012         Controlled Substances Monitoring:                                    ASSESSMENT     Patient Active Problem List    Diagnosis Date Noted    Bilateral impacted cerumen 01/07/2020    Idiopathic thromb[facility]themia ([facility] 09/27/2019    Stuttering 08/30/2016    Spinal stenosis in cervical region 04/20/2016    Empty sella syndrome ([facility] 02/23/2016    Intractable hemiplegic migraine with status migrainosus 02/23/2016    Pancytopenia ([facility] 05/18/2012    Raynaud disease 05/17/2012    SLE (systemic lupus erythematosus related syndrome) ([facility] 05/17/2012        Diagnosis:     ICD-10-CM    1. Dermatitis  L30.9        PLAN:   Kenalog cream 0.1% 3 times daily  Return back in 1 week to recheck        Patient Instructions   Use the Kenalog 3 times a day on the affected area  Follow-up with your primary care physician next week      Return in about 1 week (around 5/13/2022) for Primary care physician Dr. Jg Tyson. Melisa Knowles reviewed my findings and recommendations with Harsha White.     Electronicallysigned by Sweta Gregory MD on 5/6/22 at 12:38 PM EDT

## 2022-05-06 NOTE — PATIENT INSTRUCTIONS
Use the Kenalog 3 times a day on the affected area  Follow-up with your primary care physician next week

## 2022-06-20 ENCOUNTER — OFFICE VISIT (OUTPATIENT)
Dept: FAMILY MEDICINE CLINIC | Age: 47
End: 2022-06-20
Payer: COMMERCIAL

## 2022-06-20 VITALS
SYSTOLIC BLOOD PRESSURE: 118 MMHG | BODY MASS INDEX: 30.73 KG/M2 | DIASTOLIC BLOOD PRESSURE: 68 MMHG | OXYGEN SATURATION: 98 % | TEMPERATURE: 97.6 F | WEIGHT: 180 LBS | HEIGHT: 64 IN | HEART RATE: 82 BPM | RESPIRATION RATE: 18 BRPM

## 2022-06-20 DIAGNOSIS — Z12.12 SCREENING FOR COLORECTAL CANCER: ICD-10-CM

## 2022-06-20 DIAGNOSIS — Z51.81 ENCOUNTER FOR MEDICATION MONITORING: ICD-10-CM

## 2022-06-20 DIAGNOSIS — M32.9 SLE (SYSTEMIC LUPUS ERYTHEMATOSUS RELATED SYNDROME) (HCC): ICD-10-CM

## 2022-06-20 DIAGNOSIS — J84.89 NSIP (NONSPECIFIC INTERSTITIAL PNEUMONIA) (HCC): ICD-10-CM

## 2022-06-20 DIAGNOSIS — M48.02 SPINAL STENOSIS IN CERVICAL REGION: ICD-10-CM

## 2022-06-20 DIAGNOSIS — E23.6 EMPTY SELLA (HCC): ICD-10-CM

## 2022-06-20 DIAGNOSIS — D47.3 IDIOPATHIC THROMBOCYTHEMIA (HCC): ICD-10-CM

## 2022-06-20 DIAGNOSIS — M79.7 FIBROMYALGIA: ICD-10-CM

## 2022-06-20 DIAGNOSIS — Z00.00 ENCOUNTER FOR WELL ADULT EXAM WITHOUT ABNORMAL FINDINGS: Primary | ICD-10-CM

## 2022-06-20 DIAGNOSIS — Z12.11 SCREENING FOR COLORECTAL CANCER: ICD-10-CM

## 2022-06-20 DIAGNOSIS — D61.818 PANCYTOPENIA (HCC): ICD-10-CM

## 2022-06-20 DIAGNOSIS — G43.411 INTRACTABLE HEMIPLEGIC MIGRAINE WITH STATUS MIGRAINOSUS: ICD-10-CM

## 2022-06-20 LAB
ALBUMIN SERPL-MCNC: 4.1 G/DL (ref 3.5–5.2)
ALP BLD-CCNC: 62 U/L (ref 35–104)
ALT SERPL-CCNC: 10 U/L (ref 0–32)
ANION GAP SERPL CALCULATED.3IONS-SCNC: 7 MMOL/L (ref 7–16)
AST SERPL-CCNC: 18 U/L (ref 0–31)
BILIRUB SERPL-MCNC: 0.4 MG/DL (ref 0–1.2)
BUN BLDV-MCNC: 10 MG/DL (ref 6–20)
CALCIUM SERPL-MCNC: 8.6 MG/DL (ref 8.6–10.2)
CHLORIDE BLD-SCNC: 108 MMOL/L (ref 98–107)
CO2: 26 MMOL/L (ref 22–29)
CREAT SERPL-MCNC: 0.8 MG/DL (ref 0.5–1)
GFR AFRICAN AMERICAN: >60
GFR NON-AFRICAN AMERICAN: >60 ML/MIN/1.73
GLUCOSE BLD-MCNC: 78 MG/DL (ref 74–99)
POTASSIUM SERPL-SCNC: 3.9 MMOL/L (ref 3.5–5)
SODIUM BLD-SCNC: 141 MMOL/L (ref 132–146)
TOTAL PROTEIN: 7.8 G/DL (ref 6.4–8.3)

## 2022-06-20 PROCEDURE — 99214 OFFICE O/P EST MOD 30 MIN: CPT | Performed by: FAMILY MEDICINE

## 2022-06-20 RX ORDER — IBUPROFEN 800 MG/1
800 TABLET ORAL
Qty: 270 TABLET | Refills: 3 | Status: SHIPPED | OUTPATIENT
Start: 2022-06-20 | End: 2023-06-20

## 2022-06-20 RX ORDER — MECLIZINE HYDROCHLORIDE 25 MG/1
TABLET ORAL
COMMUNITY
Start: 2022-05-20 | End: 2022-09-19

## 2022-06-20 SDOH — ECONOMIC STABILITY: FOOD INSECURITY: WITHIN THE PAST 12 MONTHS, YOU WORRIED THAT YOUR FOOD WOULD RUN OUT BEFORE YOU GOT MONEY TO BUY MORE.: NEVER TRUE

## 2022-06-20 SDOH — ECONOMIC STABILITY: FOOD INSECURITY: WITHIN THE PAST 12 MONTHS, THE FOOD YOU BOUGHT JUST DIDN'T LAST AND YOU DIDN'T HAVE MONEY TO GET MORE.: NEVER TRUE

## 2022-06-20 ASSESSMENT — ENCOUNTER SYMPTOMS
WHEEZING: 0
VOMITING: 1
NAUSEA: 1
SORE THROAT: 0
CONSTIPATION: 0
BACK PAIN: 0
DIARRHEA: 0
BLOOD IN STOOL: 0
SHORTNESS OF BREATH: 0
ABDOMINAL PAIN: 0
COUGH: 0

## 2022-06-20 ASSESSMENT — PATIENT HEALTH QUESTIONNAIRE - PHQ9
SUM OF ALL RESPONSES TO PHQ QUESTIONS 1-9: 1
SUM OF ALL RESPONSES TO PHQ QUESTIONS 1-9: 0
2. FEELING DOWN, DEPRESSED OR HOPELESS: 0
SUM OF ALL RESPONSES TO PHQ QUESTIONS 1-9: 0
SUM OF ALL RESPONSES TO PHQ QUESTIONS 1-9: 0
SUM OF ALL RESPONSES TO PHQ QUESTIONS 1-9: 1
SUM OF ALL RESPONSES TO PHQ9 QUESTIONS 1 & 2: 1
SUM OF ALL RESPONSES TO PHQ9 QUESTIONS 1 & 2: 0
2. FEELING DOWN, DEPRESSED OR HOPELESS: 0
SUM OF ALL RESPONSES TO PHQ QUESTIONS 1-9: 0
1. LITTLE INTEREST OR PLEASURE IN DOING THINGS: 1
1. LITTLE INTEREST OR PLEASURE IN DOING THINGS: 0

## 2022-06-20 ASSESSMENT — SOCIAL DETERMINANTS OF HEALTH (SDOH): HOW HARD IS IT FOR YOU TO PAY FOR THE VERY BASICS LIKE FOOD, HOUSING, MEDICAL CARE, AND HEATING?: NOT HARD AT ALL

## 2022-06-20 ASSESSMENT — LIFESTYLE VARIABLES: HOW OFTEN DO YOU HAVE A DRINK CONTAINING ALCOHOL: NEVER

## 2022-06-20 NOTE — PROGRESS NOTES
Well Adult Note  Name: Ryan Alvarado Date: 2022   MRN: [id number] Sex: Female   Age: 55 y.o. Ethnicity: Non- / Non    : 1975 Race: Black /       Sai Maldonado is here for well adult exam.    History:  Reports sensation of mucus in the back or her throat with coughing. Denies F/C, sinus or allergy symptoms. She is followed by [facility] for SLE and complications. Review of Systems   HENT: Negative for congestion, ear pain and sore throat. Respiratory: Negative for cough, shortness of breath and wheezing. Cardiovascular: Negative for chest pain, palpitations and leg swelling. Gastrointestinal: Positive for nausea and vomiting. Negative for abdominal pain, blood in stool, constipation and diarrhea. Genitourinary: Negative for dysuria, frequency, hematuria and urgency. Musculoskeletal: Negative for back pain, myalgias and neck pain. Skin: Negative for rash. Neurological: Positive for dizziness. Negative for weakness and headaches. Psychiatric/Behavioral: The patient is not nervous/anxious. Allergies   Allergen Reactions    Percocet [Oxycodone-Acetaminophen] Swelling       Prior to Visit Medications    Medication Sig Taking?  Authorizing Provider   meclizine (ANTIVERT) 25 MG tablet  Yes Historical Provider, MD   acyclovir (ZOVIRAX) 400 MG tablet Take 400 mg by mouth 2 times daily Yes Historical Provider, MD   predniSONE (DELTASONE) 10 MG tablet Take 10 mg by mouth daily Yes Historical Provider, MD   ondansetron (ZOFRAN) 4 MG tablet Take 1 tablet by mouth 3 times daily as needed for Nausea or Vomiting Yes Quintin Jean MD   Mycophenolate Sodium 360 MG TBEC Take 360 mg by mouth 2 times daily  Yes Historical Provider, MD   naproxen (NAPROSYN) 500 MG tablet Take 500 mg by mouth  Yes Historical Provider, MD   hydroxychloroquine (PLAQUENIL) 200 MG tablet Take by mouth 2 times daily Yes Historical Provider, MD   amitriptyline distress. Breath sounds: No stridor. No wheezing or rales. Chest:      Chest wall: No tenderness. Abdominal:      General: Bowel sounds are normal. There is no distension. Palpations: Abdomen is soft. There is no mass. Tenderness: There is no abdominal tenderness. There is no guarding. Musculoskeletal:         General: No tenderness. Normal range of motion. Cervical back: Normal range of motion and neck supple. Lymphadenopathy:      Cervical: No cervical adenopathy. Skin:     General: Skin is warm and dry. Coloration: Skin is not pale. Findings: No erythema or rash. Neurological:      Mental Status: She is alert and oriented to person, place, and time. Psychiatric:         Behavior: Behavior normal.         Thought Content: Thought content normal.         Assessment / Plan:      Maia Vargas was seen today for annual exam.    Diagnoses and all orders for this visit:    Encounter for well adult exam without abnormal findings: Due for lab work. Agreed to Cologuard. Discussed ACP; patient will consider  -     FIT-DNA (Cologuard)    Encounter for medication monitoring: To be done before prescribing ibuprofen  -     CBC with Auto Differential; Future  -     Comprehensive Metabolic Panel; Future    NSIP (nonspecific interstitial pneumonia) [facility]): Monitored by another provider    Pancytopenia [facility]): Monitored by another provider    SLE (systemic lupus erythematosus related syndrome) ([facility]): Monitored by another provider    Empty sella [facility]): Monitored by another provider    Idiopathic thrombocythemia ([facility]): Monitored by another provider    Intractable hemiplegic migraine with status migrainosus: Ineffective relief with naproxen; inquired about resuming ibuprofen        -     Discontinue naproxen  -     ibuprofen (ADVIL;MOTRIN) 800 MG tablet;  Take 1 tablet by mouth 3 times daily (with meals)    Spinal stenosis in cervical region: Ineffective relief with naproxen; inquired about resuming ibuprofen        -     Discontinue naproxen        -     ibuprofen (ADVIL;MOTRIN) 800 MG tablet; Take 1 tablet by mouth 3 times daily (with meals)    Fibromyalgia: Ineffective relief with naproxen; inquired about resuming ibuprofen        -     Discontinue naproxen        -     ibuprofen (ADVIL;MOTRIN) 800 MG tablet; Take 1 tablet by mouth 3 times daily (with meals)    Screening for colorectal cancer  -     FIT-DNA (Cologuard)               Personalized Preventive Plan   Current Health Maintenance Status  Immunization History   Administered Date(s) Administered    Influenza Virus Vaccine 03/02/2017, 10/24/2017    Pneumococcal Polysaccharide ([id number]) 03/02/2017    Tdap (Boostrix, Adacel) 05/03/2016        Health Maintenance   Topic Date Due    COVID-19 Vaccine (1) Never done    Pneumococcal 0-64 years Vaccine (2 - PCV) 03/02/2018    Colorectal Cancer Screen  Never done    Depression Screen  06/08/2022    Flu vaccine (Season Ended) 09/01/2022    Lipids  09/16/2024    DTaP/Tdap/Td vaccine (2 - Td or Tdap) 05/03/2026    Hepatitis C screen  Completed    HIV screen  Completed    Hepatitis A vaccine  Aged Out    Hepatitis B vaccine  Aged Out    Hib vaccine  Aged Out    Meningococcal (ACWY) vaccine  Aged Out     Recommendations for [employer] Due: see orders and patient instructions/AVS.    Follow Up:  Return in 1 year (on 6/20/2023).         Jeannette Hickman MD

## 2022-06-20 NOTE — PATIENT INSTRUCTIONS
afraid of having pain, losing your independence, or being kept alive by machines.)   Where would you prefer to die? (Your home? A hospital? A nursing home?)   Do you want to donate your organs when you die?  Do you want certain Nondenominational practices performed before you die? When should you call for help? Be sure to contact your doctor if you have any questions. Where can you learn more? Go to [email or web address] and sign in to your Tweekaboo account. Enter R264 in the revoPT box to learn more about \"Advance Directives: Care Instructions. \"     If you do not have an account, please click on the \"Sign Up Now\" link. Current as of: October 18, 2021               Content Version: 13.2  © 2006-2022 [employer]. Care instructions adapted under license by [facility] ([facility]). If you have questions about a medical condition or this instruction, always ask your healthcare professional. [place] [address] any warranty or liability for your use of this information. Learning About Medical Power of   What is a medical power of ? A medical power of , also called a durable power of  for health care, is one type of the legal forms called advance directives. It lets you name the person you want to make treatment decisions for you if you can't speak or decide for yourself. The person you choose is called your health care agent. This person is also called a health care proxy or health care surrogate. A medical power of  may be called something else in your state. How do you choose a health care agent? Choose your health care agent carefully. This person may or may not be a familymember. Talk to the person before you make your final decision. Make sure he or she iscomfortable with this responsibility. It's a good idea to choose someone who:   Is at least 25years old.    Knows you well and understands what makes life meaningful for you.  Understands your Scientologist and moral values.  Will do what you want, not what he or she wants.  Will be able to make difficult choices at a stressful time.  Will be able to refuse or stop treatment, if that is what you would want, even if you could die.  Will be firm and confident with health professionals if needed.  Will ask questions to get needed information.  Lives near you or agrees to travel to you if needed. Your family may help you make medical decisions while you can still be part of that process. But it's important to choose one person to be your health careagent in case you aren't able to make decisions for yourself. If you don't fill out the legal form and name a health care agent, thedecisions your family can make may be limited. A health care agent may be called something else in your state. Who will make decisions for you if you don't have a health care agent? If you don't have a health care agent or a living will, you may not get the care you want. Decisions may be made by family members who disagree about your medical care. Or decisions may be made by a medical professional who doesn'tknow you well. In some cases, a  makes the decisions. When you name a health care agent, it is very clear who has the power to [place] decisions for you. How do you name a health care agent? You name your health care agent on a legal form. This form is usually called a medical power of . Ask your hospital, state bar association, or officeon aging where to find these forms. You must sign the form to make it legal. Some states require you to get the form notarized. This means that a person called a  watches you sign the form and then he or she signs the form. Some states also require thattwo or more witnesses sign the form. Be sure to tell your family members and doctors who your health care agent is. Where can you learn more?   Go to [email or web address] and sign in to your DivvyCloud account. Enter [id number] in the [facility] box to learn more about \"Learning About [address]. \"     If you do not have an account, please click on the \"Sign Up Now\" link. Current as of: October 18, 2021               Content Version: 13.2  © 1382-6102 Healthwise, Binary Thumb. Care instructions adapted under license by [facility] ([facility]). If you have questions about a medical condition or this instruction, always ask your healthcare professional. [address] any warranty or liability for your use of this information. Learning About Living Chaya Luis  What is a living will? A living will, also called a declaration, is a legal form. It tells your family and your doctor your wishes when you can't speak for yourself. It's used by the health professionals who will treat you as you near the end of your life or ifyou get seriously hurt or ill. If you put your wishes in writing, your loved ones and others will know what kind of care you want. They won't need to guess. This can ease your mind and behelpful to others. And you can change or cancel your living will at any time. A living will is not the same as an estate or property will. An estate willexplains what you want to happen with your money and property after you die. How do you use it? Keep these facts in mind about how a living will is used.  Your living will is used only if you can't speak or make decisions for yourself. Most often, one or more doctors must certify that you can't speak or decide for yourself before your living will takes effect.  If you get better and can speak for yourself again, you can accept or refuse any treatment. It doesn't matter what you said in your living will.  Some states may limit your right to refuse treatment in certain cases.  For example, you may need to clearly state in your living will that you don't want artificial hydration and nutrition, such as being fed through a tube. Is a living will a legal document? A living will is a legal document. Each state has its own laws about livingwills. And a living will may be called something else in your state. Here are some things to know about living weber.  You don't need an  to complete a living will. But legal advice can be helpful if your state's laws are unclear. It can also help if your health history is complicated or your family can't agree on what should be in your living will.  You can change your living will at any time. Some people find that their wishes about end-of-life care change as their health changes. If you make big changes to your living will, complete a new form.  If you move to another state, make sure that your living will is legal in the state where you now live. In most cases, doctors will respect your wishes even if you have a form from a different state.  You might use a universal form that has been approved by many states. This kind of form can sometimes be filled out and stored online. Your digital copy will then be available wherever you have a connection to the internet. The doctors and nurses who need to treat you can find it right away.  Your state may offer an online registry. This is another place where you can store your living will online.  It's a good idea to get your living will notarized. This means using a person called a  to watch two people sign, or witness, your living will. What should you know when you create a living will? Here are some questions to ask yourself as you make your living will.  Do you know enough about life support methods that might be used? If not, talk to your doctor so you know what might be done if you can't breathe on your own, your heart stops, or you can't swallow.  What things would you still want to be able to do after you receive life-support methods?  Would you want to be able to walk? To speak? To eat on your own? To live without the help of machines?  Do you want certain Uatsdin practices performed if you become very ill?  If you have a choice, where do you want to be cared for? In your home? At a hospital or nursing home?  If you have a choice at the end of your life, where would you prefer to die? At home? In a hospital or nursing home? Somewhere else?  Would you prefer to be buried or cremated?  Do you want your organs to be donated after you die? What should you do with your living will?  Make sure that your family members and your health care agent have copies of your living will (also called a declaration).  Give your doctor a copy of your living will. Ask to have it kept as part of your medical record. If you have more than one doctor, make sure that each one has a copy.  Put a copy of your living will where it can be easily found. For example, some people may put a copy on their refrigerator door. If you are using a digital copy, be sure your doctor, family members, and health care agent know how to find and access it. Where can you learn more? Go to [email or web address] and sign in to your Ballard Power Systems account. Enter Q847 in the Immunologix box to learn more about \"Learning About Living Fiordaliza Marrow. \"     If you do not have an account, please click on the \"Sign Up Now\" link. Current as of: October 18, 2021               Content Version: 13.2  © 2006-2022 Healthwise, Misticom. Care instructions adapted under license by [facility]. If you have questions about a medical condition or this instruction, always ask your healthcare professional. [place] [address] any warranty or liability for your use of this information. Well Visit, Ages 25 to 48: Care Instructions  Overview     Well visits can help you stay healthy.  Your doctor has checked your overall health and may have suggested ways to take good care of yourself. Your doctor also may have recommended tests. At home, you can help prevent illness withhealthy eating, regular exercise, and other steps. Follow-up care is a key part of your treatment and safety. Be sure to make and go to all appointments, and call your doctor if you are having problems. It's also a good idea to know your test results and keep alist of the medicines you take. How can you care for yourself at home?  Get screening tests that you and your doctor decide on. Screening helps find diseases before any symptoms appear.  Eat healthy foods. Choose fruits, vegetables, whole grains, protein, and low-fat dairy foods. Limit fat, especially saturated fat. Reduce salt in your diet.  Limit alcohol. If you are a man, have no more than 2 drinks a day or 14 drinks a week. If you are a woman, have no more than 1 drink a day or 7 drinks a week.  Get at least 30 minutes of physical activity on most days of the week. Walking is a good choice. You also may want to do other activities, such as running, swimming, cycling, or playing tennis or team sports. Discuss any changes in your exercise program with your doctor.  Reach and stay at a healthy weight. This will lower your risk for many problems, such as obesity, diabetes, heart disease, and high blood pressure.  Do not smoke or allow others to smoke around you. If you need help quitting, talk to your doctor about stop-smoking programs and medicines. These can increase your chances of quitting for good.  Care for your mental health. It is easy to get weighed down by worry and stress. Learn strategies to manage stress, like deep breathing and mindfulness, and stay connected with your family and community. If you find you often feel sad or hopeless, talk with your doctor. Treatment can help.  Talk to your doctor about whether you have any risk factors for sexually transmitted infections (STIs).  You can help prevent STIs if you wait to have sex with a new partner (or partners) until you've each been tested for STIs. It also helps if you use condoms (male or female condoms) and if you limit your sex partners to one person who only has sex with you. Vaccines are available for some STIs, such as HPV.  Use birth control if it's important to you to prevent pregnancy. Talk with your doctor about the choices available and what might be best for you.  If you think you may have a problem with alcohol or drug use, talk to your doctor. This includes prescription medicines (such as amphetamines and opioids) and illegal drugs (such as cocaine and methamphetamine). Your doctor can help you figure out what type of treatment is best for you.  Protect your skin from too much sun. When you're outdoors from 10 a.m. to 4 p.m., stay in the shade or cover up with clothing and a hat with a wide brim. Wear sunglasses that block UV rays. Even when it's cloudy, put broad-spectrum sunscreen (SPF 30 or higher) on any exposed skin.  See a dentist one or two times a year for checkups and to have your teeth cleaned.  Wear a seat belt in the car. When should you call for help? Watch closely for changes in your health, and be sure to contact your doctor if you have any problems or symptoms that concern you. Where can you learn more? Go to [email or web address]://kelly.[email or web address] org and sign in to your [email or web address] account. Enter P072 in the [facility] box to learn more about \"Well Visit, Ages 25 to 48: Care Instructions. \"     If you do not have an account, please click on the \"Sign Up Now\" link. Current as of: October 6, 2021               Content Version: 13.2  © 7468-0394 Healthwise, Incorporated. Care instructions adapted under license by [facility] ([facility]).  If you have questions about a medical condition or this instruction, always ask your healthcare professional. [address] any warranty or liability for your use of this information. Learning About Changing a Habit by Setting Goals  How can you change a habit? If you've decided to change a habit--whether it's quitting smoking, lowering your blood pressure, becoming more active, or doing something else to improve your health--congratulations! Making that decision is the first step towardmaking a change. What happens next? Have a reason. Set goals you can reach. Prepare forslip-ups. And get support. What's your reason? Your reason for wanting to change a habit is really important. Maybe you want to quit smoking so that you can avoid future health problems. Or maybe you want to eat a healthier diet so you can lose weight. If you have high blood pressure, your reason may be clear: to lower your blood pressure. Maybe yousmoke and want to save money on cigarettes. You need to feel ready to make a change. If you don't feel ready now, that's okay. You can still be thinking and planning. When you truly want to [facility], you're ready for the next step. It's not easy to change habits--but you can do it. Taking the time to reallythink about what will motivate or inspire you will help you reach your goals. How do you set goals? Setting goals can help a lot when you're trying to make a healthy change.  Focus on small goals. This will help you reach larger goals over time. With smaller goals, you'll have success more often, which will help you stay with it. For example, your large goal may be to lose 20 pounds. Your small goal could be to lose 5.   Write down your goals. This will help you remember, and you'll have a clearer idea of what you want to achieve. Use a journal or notebook to record your goals. Hang up your plan where you will see it often as a reminder of what you're trying to do.  Make your goals specific. Specific goals help you measure your progress.  For example, setting a goal to eat one extra serving of vegetables a day is better than a general goal to \"eat more vegetables. \"   Focus on one goal at a time. By doing this, you're less likely to feel overwhelmed and then give up.  When you reach a goal, reward yourself. Celebrate your new behavior and success for several days, and then think about setting your next goal.  How can you prepare for slip-ups? It's perfectly normal to try to change a habit, go along fine for a while, and then have a setback. Lots of people try and try again before they reach theirgoals. What are the things that might cause a setback for you? If you have tried tochange a habit before, think about what helped you and what got in your way. By thinking about these barriers now, you can plan ahead for how to deal withthem if they happen. There will be times when you slip up and don't make your goal for the week. When that happens, don't get mad at yourself. Learn from the experience. Ask yourself what got in the way of reaching your goal. Positive thinking goes along way when you're making lifestyle changes. How can you get support?  Get a partner. It's motivating to know that someone is trying to make the same change that you're making, like being more active or changing your eating habits. You have someone who is counting on you to help them succeed. That person can also remind you how far you've come.  Get friends and family involved. They can exercise with you. Or they can encourage you by saying how they admire what you are doing. Family members can join you in your healthy eating efforts. Don't be afraid to tell family and friends that their encouragement makes a big difference to you.  Join a class or support group. People in these groups often have some of the same barriers you have. They can give you support when you don't feel like staying with your plan. They can boost your morale when you need a lift. Rolan Ji also find a number of online support groups.  Encourage yourself.  When you feel like giving up, don't waste energy feeling bad about yourself. Remember your reason for wanting to change, think about the progress you've made, and give yourself a pep talk and a pat on the back.  Get professional help. A dietitian can help you make your diet healthier while still allowing you to eat foods that you enjoy. A  or physical therapist can help design an exercise program that is fun and easy to stay on. A counselor, a , or your doctor can help you overcome hurdles, reduce stress, or quit smoking. Where can you learn more? Go to https://e(ye)BRAINpeAnytime Fitnesseweb.InteRNA Technologies. org and sign in to your SirenServ account. Enter N688 in the BioMedical Enterprises box to learn more about \"Learning About Changing a Habit by Setting Goals. \"     If you do not have an account, please click on the \"Sign Up Now\" link. Current as of: June 16, 2021               Content Version: 13.2  © 2006-2022 Sparus Software. Care instructions adapted under license by [facility] ([facility]). If you have questions about a medical condition or this instruction, always ask your healthcare professional. [place] [address] any warranty or liability for your use of this information. A Healthy Lifestyle: Care Instructions  Your Care Instructions     A healthy lifestyle can help you feel good, stay at a healthy weight, and have plenty of energy for both work and play. A healthy lifestyle is something youcan share with your whole family. A healthy lifestyle also can lower your risk for serious health problems, suchas high blood pressure, heart disease, and diabetes. You can follow a few steps listed below to improve your health and the healthof your family. Follow-up care is a key part of your treatment and safety. Be sure to make and go to all appointments, and call your doctor if you are having problems. It's also a good idea to know your test results and keep alist of the medicines you take.   How can you care for yourself at home?  Do not eat too much sugar, fat, or fast foods. You can still have dessert and treats now and then. The goal is moderation.  Start small to improve your eating habits. Pay attention to portion sizes, drink less juice and soda pop, and eat more fruits and vegetables. ? Eat a healthy amount of food. A 3-ounce serving of meat, for example, is about the size of a deck of cards. Fill the rest of your plate with vegetables and whole grains. ? Limit the amount of soda and sports drinks you have every day. Drink more water when you are thirsty. ? Eat plenty of fruits and vegetables every day. Have an apple or some carrot sticks as an afternoon snack instead of a candy bar. Try to have fruits and/or vegetables at every meal.   Make exercise part of your daily routine. You may want to start with simple activities, such as walking, bicycling, or slow swimming. Try to be active 30 to 60 minutes every day. You do not need to do all 30 to 60 minutes all at once. For example, you can exercise 3 times a day for 10 or 20 minutes. Moderate exercise is safe for most people, but it is always a good idea to talk to your doctor before starting an exercise program.   Keep moving. Maralyn Simon the lawn, work in the garden, or [place]. Take the stairs instead of the elevator at work.  If you smoke, quit. People who smoke have an increased risk for heart attack, stroke, cancer, and other lung illnesses. Quitting is hard, but there are ways to boost your chance of quitting tobacco for good. ? Use nicotine gum, patches, or lozenges. ? Ask your doctor about stop-smoking programs and medicines. ? Keep trying. In addition to reducing your risk of diseases in the future, you will notice some benefits soon after you stop using tobacco. If you have shortness of breath or asthma symptoms, they will likely get better within a few weeks after you quit.  Limit how much alcohol you drink.  Moderate amounts of alcohol (up to 2 drinks a day for men, 1 drink a day for women) are okay. But drinking too much can lead to liver problems, high blood pressure, and other health problems. Family health  If you have a family, there are many things you can do together to improve yourhealth.  Eat meals together as a family as often as possible.  Eat healthy foods. This includes fruits, vegetables, lean meats and dairy, and whole grains.  Include your family in your fitness plan. Most people think of activities such as jogging or tennis as the way to fitness, but there are many ways you and your family can be more active. Anything that makes you breathe hard and gets your heart pumping is exercise. Here are some tips:  ? Walk to do errands or to take your child to school or the bus.  ? Go for a family bike ride after dinner instead of watching TV. Where can you learn more? Go to https://Moto Europaneeraj.Voxel. org and sign in to your PrivateMarkets account. Enter P868 in the UseTogether box to learn more about \"A Healthy Lifestyle: Care Instructions. \"     If you do not have an account, please click on the \"Sign Up Now\" link. Current as of: June 16, 2021               Content Version: 13.2  © 2006-2022 Healthwise, Initiative Gaming. Care instructions adapted under license by [facility] ([facility]). If you have questions about a medical condition or this instruction, always ask your healthcare professional. [place] [address] any warranty or liability for your use of this information. WELL ADULT LIFESTYLE INSTRUCTIONS:    Ani Cummings a day in the next week to spend an hour reviewing the information below then:     1) determine your health goals for the year   2) determine what changes you need to achieve those goals   3) design your daily routine, shopping habits etc to implement those changes        Default Right Action (no choices)       Make it EASY to do the RIGHT THINGS.    4) I invite you to send me your plans via Etherstack so I can continue to help you       with them    Examine your lifestyle with an emphasis on BARRIERS to bad and good habits and how you can design your life to make better choices. If you want to feel better these are the FUNDAMENTAL PILLARS of Wellness:    1)  You can choose to Get 150 min/week of moderate exercise (can talk but can't        sing) or 75 min/week of vigorous exercise (can't talk). This will enhance your sense of well being (Exercise is as good as medicine for   depression.)    2)  You can choose to Get 7-9 hours of sleep per night    Detoxifies your brain, reduces risk of dementia    3)  You can choose to Strength Train 2 x a week on non-consecutive days   This will improve function and reduce risk of injury. Body weight type exercises   such as Yoga and Pilates are excellent choices. 4)  You can choose Good Nutrition. Only eat your goal weight (in lbs) x 10        calories/day and get 5 servings of Vegetables/day   Plant based diets reduce risk of heart attack/stroke and will help you feel full on   less food. Avoid highly processed foods and processed carbohydrates. 5)  You can choose Moderate alcohol intake < 1-2 drinks/day   Alcohol will disrupt your sleep and add calories to your day. 6)  You can choose to Develop a Charismatic/Supportive relationship. This will strengthen your resilience for the ups and downs. 7)  You can choose to Practice Mindfulness. An hour a day of prayer/meditation/gratitude will change your life! If you are trying to lose weight, here are some recommendations for weight loss:  Not every weight loss program is appropriate for everybody. ..  good online sources include Noom (more social with daily check ins), Lifesum (similar but less social) and Naturally slim, as well as Brandneu ($1500)    The GI Diet or \"Primal diet\", Intermittent fasting can also be effective choices.     If you have diabetes treated with insulin be sure to ask for specific guidance around meals. Take your desired weight in pounds and multiply by 10 and that is your average daily calorie allowance. For example if you wish to weigh 170 lb x 10 = 1700 lien/day (this is how to gradually lose the weight and maintain your desired weight). Avoid soda/coke and all \"wet carbs\" => Drink ice water instead    Drink a large glass of ice water before meals and EAT SLOWLY (talk while you eat)! Rethink your hunger => it means your losing weight. Minimize highly processed carbohydrates as they stimulate your appetite:  Specifically cut back on Bread, Rice, Pasta and Potatoes    Avoid eating calories after 6 pm        DASH Diet: After Your Visit  Your Care Instructions  The DASH diet is an eating plan that can help lower your blood pressure. DASH stands for Dietary Approaches to Stop Hypertension. Hypertension is high blood pressure. The DASH diet focuses on eating foods that are high in calcium, potassium, and magnesium. These nutrients can lower blood pressure. The foods that are highest in these nutrients are fruits, vegetables, low-fat dairy products, nuts, seeds, and legumes. But taking calcium, potassium, and magnesium supplements instead of eating foods that are high in those nutrients does not have the same effect. The DASH diet also includes whole grains, fish, and poultry. The DASH diet is one of several lifestyle changes your doctor may recommend to lower your high blood pressure. Your doctor may also want you to decrease the amount of sodium in your diet. Lowering sodium while following the DASH diet can lower blood pressure even further than just the DASH diet alone. Follow-up care is a key part of your treatment and safety. Be sure to make and go to all appointments, and call your doctor if you are having problems. Its also a good idea to know your test results and keep a list of the medicines you take. How can you care for yourself at home?   Following the [facility] diet  · Eat 4 to 5 servings of fruit each day. A serving is 1 medium-sized piece of fruit, ½ cup chopped or canned fruit, 1/4 cup dried fruit, or 4 ounces (½ cup) of fruit juice. Choose fruit more often than fruit juice. · Eat 4 to 5 servings of vegetables each day. A serving is 1 cup of lettuce or raw leafy vegetables, ½ cup of chopped or cooked vegetables, or 4 ounces (½ cup) of vegetable juice. Choose vegetables more often than vegetable juice. · Get 2 to 3 servings of low-fat and fat-free dairy each day. A serving is 8 ounces of milk, 1 cup of yogurt, or 1 ½ ounces of cheese. · Eat 7 to 8 servings of grains each day. A serving is 1 slice of bread, 1 ounce of dry cereal, or ½ cup of cooked rice, pasta, or cooked cereal. Try to choose whole-grain products as much as possible. · Limit lean meat, poultry, and fish to 6 ounces each day. Six ounces is about the size of two decks of cards. · Eat 4 to 5 servings of nuts, seeds, and legumes (cooked dried beans, lentils, and split peas) each week. A serving is 1/3 cup of nuts, 2 tablespoons of seeds, or ½ cup cooked dried beans or peas. · Limit sweets and added sugars to 5 servings or less a week. A serving is 1 tablespoon jelly or jam, ½ cup sorbet, or 1 cup of lemonade. Tips for success  · Start small. Do not try to make dramatic changes to your diet all at once. You might feel that you are missing out on your favorite foods and then be more likely to not follow the plan. Make small changes, and stick with them. Once those changes become habit, add a few more changes. · Try some of the following:  ¨ Make it a goal to eat a fruit or vegetable at every meal and at snacks. This will make it easy to get the recommended amount of fruits and vegetables each day. ¨ Try yogurt topped with fruit and nuts for a snack or healthy dessert. ¨ Add lettuce, tomato, cucumber, and onion to sandwiches.   ¨ Combine a ready-made pizza crust with low-fat mozzarella cheese and lots of vegetable toppings. Try using tomatoes, squash, spinach, broccoli, carrots, cauliflower, and onions. ¨ Have a variety of cut-up vegetables with a low-fat dip as an appetizer instead of chips and dip. ¨ Sprinkle sunflower seeds or chopped almonds over salads. Or try adding chopped walnuts or almonds to cooked vegetables. Try some vegetarian meals using beans and peas. Add garbanzo or kidney beans to salads. Make burritos and tacos with mashed escobar beans or black beans    © 4916-7276 Healthwise, Incorporated. Care instructions adapted under license by [facility]. This care instruction is for use with your licensed healthcare professional. If you have questions about a medical condition or this instruction, always ask your healthcare professional. [address] any warranty or liability for your use of this information. Content Version: 9.4.05885; Last Revised: March 15, 2012                  Keep Your Memory Ally Cummings       Many factors can affect your ability to remembera hectic lifestyle, aging, stress, chronic disease, and certain medicines. But, there are steps you can take to sharpen your mind and help preserve your memory. Challenge Your Brain   Regularly challenging your mind may help keeps it in top shape. Good mental exercises include:   · Crossword puzzlesUse a dictionary if you need it; you will learn more that way. · Brainteasers Try some! · Crafts, such as wood working and sewing   · Hobbies, such as gardening and building model airplanes   · [facility] old friends or join groups to meet new ones.    · Reading   · Learning a new language   · Taking a class, whether it be art history or arianna chi   · TravelingExperience the food, history, and culture of your destination   · Learning to use a computer   · Going to museums, the theater, or thought-provoking movies   · Changing things in your daily life, such as reversing your pattern in the grocery store or brushing your teeth using your nondominant hand   Use Memory Aids   There is no need to remember every detail on your own. These memory aids can help:   · Calendars and day planners   · Electronic organizers to store all sorts of helpful informationThese devices can \"beep\" to remind you of appointments. · A book of days to record birthdays, anniversaries, and other occasions that occur on the same date every year   · Detailed \"to-do\" lists and strategically placed sticky notes   · Quick \"study\" sessionsBefore a gathering, review who will be there so their names will be fresh in your mind. · Establish routinesFor example, keep your keys, wallet, and umbrella in the same place all the time or take medicine with your 8:00 AM glass of juice   Live a Healthy Life   Many actions that will keep your body strong will do the same for your mind. For example:   Talk to Your Doctor About Herbs and Supplements    Malnutrition and vitamin deficiencies can impair your mental function. For example, vitamin B12 deficiency can cause a range of symptoms, including confusion. But, what if your nutritional needs are being met? Can herbs and supplements still offer a benefit? Researchers have investigated a range of natural remedies, such as ginkgo , ginseng , and the supplement phosphatidylserine (PS). So far, though, the evidence is inconsistent as to whether these products can improve memory or thinking. If you are interested in taking herbs and supplements, talk to your doctor first because they may interact with other medicines that you are taking. Exercise Regularly    Among the many benefits of regular exercise are increased blood flow to the brain and decreased risk of certain diseases that can interfere with memory function. One study found that even moderate exercise has a beneficial effect.  Examples of \"moderate\" exercise include:   · Playing 18 holes of golf once a week, without a cart   · Playing tennis twice a week · Walking one mile per day   Manage Stress    It can be tough to remember what is important when your mind is cluttered. Make time for relaxation. Choose activities that calm you down, and make it routine. Manage Chronic Conditions    Side effects of high blood pressure , diabetes, and heart disease can interfere with mental function. Many of the lifestyle steps discussed here can help manage these conditions. Strive to eat a healthy diet, exercise regularly, get stress under control, and follow your doctor's advice for your condition. Minimize Medications    Talk to your doctor about the medicines that you take. Some may be unnecessary. Also, healthy lifestyle habits may lower the need for certain drugs. Last Reviewed: April 2010 Bhaskar Bill MD   Updated: 4/13/2010       Keeping Home a [facility]       As we get older, changes in balance, gait, strength, vision, hearing, and cognition make even the most youthful senior more prone to accidents. Falls are one of the leading health risks for older people. This increased risk of falling is related to:   · Aging process (eg, decreased muscle strength, slowed reflexes)   · Higher incidence of chronic health problems (eg, arthritis, diabetes) that may limit mobility, agility or sensory awareness   · Side effects of medicine (eg, dizziness, blurred vision)especially medicines like prescription pain medicines and drugs used to treat mental health conditions   Depending on the brittleness of your bones, the consequences of a fall can be serious and long lasting. Home Life   Research by the Association of Aging [facility]) shows that some home accidents among older adults can be prevented by making simple lifestyle changes and basic modifications and repairs to the home environment. Here are some lifestyle changes that experts recommend:   · Have your hearing and vision checked regularly. Be sure to wear prescription glasses that are right for you.    · Speak to your doctor or pharmacist about the possible side effects of your medicines. A number of medicines can cause dizziness. · If you have problems with sleep, talk to your doctor. · Limit your intake of alcohol. · If necessary, use a cane or walker to help maintain your balance. · Wear supportive, rubber-soled shoes, even at home. If you live in a region that gets wintry weather, you may want to put special cleats on your shoes to prevent you from slipping on the snow and ice. · Exercise regularly to help maintain muscle tone, agility, and balance. · Always hold the banister when going up or down stairs. Also, use  bars when getting in or out of the bath or shower, or using the toilet. · To avoid dizziness, get up slowly from a lying down position. Sit up first, dangling your legs for a minute or two before rising to a standing position. Overall Home Safety Check   According to the Consumer Product Safety Commision's \"Older Consumer Home Safety Checklist,\" it is important to check for potential hazards in each room. And remember, proper lighting is an essential factor in home safety. If you cannot see clearly, you are more likely to fall. Important questions to ask yourself include:   · Are lamp, electric, extension, and telephone cords placed out of the flow of traffic and maintained in good condition? Have frayed cords been replaced? · Are all small rugs and runners slip resistant? If not, you can secure them to the floor with a special double-sided carpet tape. · Are smoke detectors properly locatedone on every floor of your home and one outside of every sleeping area? Are they in good working order? Are batteries replaced at least once a year? · Do you have a well-maintained carbon monoxide detector outside every sleeping are in your home? · Does your furniture layout leave plenty of space to maneuver between and around chairs, tables, beds, and sofas?    · Are hallways, stairs and passages between rooms well lit? Can you reach a lamp without getting out of bed? · Are floor surfaces well maintained? Shag rugs, high-pile carpeting, tile floors, and polished wood floors can be particularly slippery. Stairs should always have handrails and be carpeted or fitted with a non-skid tread. · Is your telephone easily reachable. Is the cord safely tucked away? Room by Room   According to the Association of Aging, bathrooms and farrukh are the two most potentially hazardous rooms in your home. In the Kitchen    · Be sure your stove is in proper working order and always make sure burners and the oven are off before you go out or go to sleep. · Keep pots on the back burners, turn handles away from the front of the stove, and keep stove clean and free of grease build-up. · Kitchen ventilation systems and range exhausts should be working properly. · Keep flammable objects such as towels and pot holders away from the cooking area except when in use. Make sure kitchen curtains are tied back. · Move cords and appliances away from the sink and hot surfaces. If extension cords are needed, install wiring guides so they do not hang over the sink, range, or working areas. · Look for coffee pots, kettles and toaster ovens with automatic shut-offs. · Keep a mop handy in the kitchen so you can wipe up spills instantly. You should also have a small fire extinguisher. · Arrange your kitchen with frequently used items on lower shelves to avoid the need to stand on a stepstool to reach them. · Make sure countertops are well-lit to avoid injuries while cutting and preparing food. In the Bathroom    · Use a non-slip mat or decals in the tub and shower, since wet, soapy tile or porcelain surfaces are extremely slippery. · Make sure bathroom rugs are non-skid or tape them firmly to the floor.  Bathtubs should have at least one, preferably two, grab bars, firmly attached to structural supports in the have a pre-established emergency exit plan. · Have a professional check your fireplace and other fuel-burning appliances yearly. Helping Hands   Baby boomers entering the montelongo years will continue to see the development of new products to help older adults live safely and independently in spite of age-related changes. Making Life More Livable  , by Katelin Lucero, lists over 1,000 products for \"living well in the mature years,\" such as bathing and mobility aids, household security devices, ergonomically designed knives and peelers, and faucet valves and knobs for temperature control. Medical supply stores and organizations are good sources of information about products that improve your quality of life and insure your safety.      Last Reviewed: November 2009 Elijah Chun MD   Updated: 3/7/2011

## 2022-06-20 NOTE — LETTER
[address] [place] [address] Luis  Phone: [phone]  Fax: [phone]    Reggie Martinez MD        June 20, 2022    Patient: Katelin Pierce   YOB: 1975   Date of Visit: 6/20/22       To Whom it May Concern:    Katelin Pierce is under my care. She has a history of asthma. She would benefit from having an air conditioner. If you have any questions or concerns, please don't hesitate to call.     Sincerely,         Reggie Martinez MD

## 2022-06-21 LAB
BASOPHILS ABSOLUTE: 0 E9/L (ref 0–0.2)
BASOPHILS RELATIVE PERCENT: 0.7 % (ref 0–2)
BURR CELLS: ABNORMAL
EOSINOPHILS ABSOLUTE: 0.03 E9/L (ref 0.05–0.5)
EOSINOPHILS RELATIVE PERCENT: 0.9 % (ref 0–6)
HCT VFR BLD CALC: 37.7 % (ref 34–48)
HEMOGLOBIN: 11.8 G/DL (ref 11.5–15.5)
LYMPHOCYTES ABSOLUTE: 1.14 E9/L (ref 1.5–4)
LYMPHOCYTES RELATIVE PERCENT: 38.2 % (ref 20–42)
MCH RBC QN AUTO: 30.3 PG (ref 26–35)
MCHC RBC AUTO-ENTMCNC: 31.3 % (ref 32–34.5)
MCV RBC AUTO: 96.7 FL (ref 80–99.9)
MONOCYTES ABSOLUTE: 0.36 E9/L (ref 0.1–0.95)
MONOCYTES RELATIVE PERCENT: 12.2 % (ref 2–12)
NEUTROPHILS ABSOLUTE: 1.47 E9/L (ref 1.8–7.3)
NEUTROPHILS RELATIVE PERCENT: 48.7 % (ref 43–80)
NUCLEATED RED BLOOD CELLS: 0.9 /100 WBC
OVALOCYTES: ABNORMAL
PDW BLD-RTO: 14.4 FL (ref 11.5–15)
PLATELET # BLD: 74 E9/L (ref 130–450)
PLATELET CONFIRMATION: NORMAL
PMV BLD AUTO: 12 FL (ref 7–12)
POIKILOCYTES: ABNORMAL
POLYCHROMASIA: ABNORMAL
RBC # BLD: 3.9 E12/L (ref 3.5–5.5)
TEAR DROP CELLS: ABNORMAL
WBC # BLD: 3 E9/L (ref 4.5–11.5)

## 2022-06-28 ENCOUNTER — TELEPHONE (OUTPATIENT)
Dept: FAMILY MEDICINE CLINIC | Age: 47
End: 2022-06-28

## 2022-06-28 NOTE — TELEPHONE ENCOUNTER
Contacted patient regarding the cologuard screening and that sent information back to [facility].  On 6/23/2022

## 2022-09-17 DIAGNOSIS — R42 DIZZINESS: Primary | ICD-10-CM

## 2022-09-19 RX ORDER — MECLIZINE HYDROCHLORIDE 25 MG/1
TABLET ORAL
Qty: 30 TABLET | Refills: 2 | Status: SHIPPED | OUTPATIENT
Start: 2022-09-19 | End: 2022-12-19

## 2023-03-11 DIAGNOSIS — R11.0 NAUSEA: ICD-10-CM

## 2023-03-12 RX ORDER — ONDANSETRON 4 MG/1
TABLET, FILM COATED ORAL
Qty: 30 TABLET | Refills: 1 | Status: SHIPPED | OUTPATIENT
Start: 2023-03-12

## 2023-03-21 ENCOUNTER — OFFICE VISIT (OUTPATIENT)
Dept: FAMILY MEDICINE CLINIC | Age: 48
End: 2023-03-21

## 2023-03-21 VITALS
RESPIRATION RATE: 16 BRPM | SYSTOLIC BLOOD PRESSURE: 124 MMHG | HEIGHT: 64 IN | OXYGEN SATURATION: 98 % | HEART RATE: 90 BPM | DIASTOLIC BLOOD PRESSURE: 68 MMHG | BODY MASS INDEX: 26.29 KG/M2 | WEIGHT: 154 LBS | TEMPERATURE: 97.9 F

## 2023-03-21 DIAGNOSIS — M62.830 MUSCLE SPASM OF BACK: ICD-10-CM

## 2023-03-21 DIAGNOSIS — M32.9 SLE (SYSTEMIC LUPUS ERYTHEMATOSUS RELATED SYNDROME) (HCC): ICD-10-CM

## 2023-03-21 DIAGNOSIS — Z79.1 ENCOUNTER FOR MONITORING CHRONIC NSAID THERAPY: ICD-10-CM

## 2023-03-21 DIAGNOSIS — M54.16 LUMBAR RADICULOPATHY: ICD-10-CM

## 2023-03-21 DIAGNOSIS — D61.818 PANCYTOPENIA (HCC): ICD-10-CM

## 2023-03-21 DIAGNOSIS — Z51.81 ENCOUNTER FOR MONITORING CHRONIC NSAID THERAPY: ICD-10-CM

## 2023-03-21 DIAGNOSIS — D47.3 IDIOPATHIC THROMBOCYTHEMIA (HCC): ICD-10-CM

## 2023-03-21 DIAGNOSIS — M54.12 CERVICAL RADICULOPATHY: Primary | ICD-10-CM

## 2023-03-21 PROBLEM — J84.89 NSIP (NONSPECIFIC INTERSTITIAL PNEUMONIA) (HCC): Status: RESOLVED | Noted: 2022-06-20 | Resolved: 2023-03-21

## 2023-03-21 RX ORDER — ARIPIPRAZOLE 10 MG/1
10 TABLET ORAL EVERY MORNING
COMMUNITY
Start: 2023-03-01

## 2023-03-21 RX ORDER — MECLIZINE HYDROCHLORIDE 25 MG/1
TABLET ORAL
COMMUNITY
Start: 2023-03-11

## 2023-03-21 RX ORDER — TIZANIDINE 2 MG/1
2 TABLET ORAL NIGHTLY PRN
Qty: 10 TABLET | Refills: 0 | Status: SHIPPED | OUTPATIENT
Start: 2023-03-21 | End: 2023-03-31

## 2023-03-21 SDOH — ECONOMIC STABILITY: INCOME INSECURITY: HOW HARD IS IT FOR YOU TO PAY FOR THE VERY BASICS LIKE FOOD, HOUSING, MEDICAL CARE, AND HEATING?: SOMEWHAT HARD

## 2023-03-21 SDOH — ECONOMIC STABILITY: HOUSING INSECURITY
IN THE LAST 12 MONTHS, WAS THERE A TIME WHEN YOU DID NOT HAVE A STEADY PLACE TO SLEEP OR SLEPT IN A SHELTER (INCLUDING NOW)?: NO

## 2023-03-21 SDOH — ECONOMIC STABILITY: FOOD INSECURITY: WITHIN THE PAST 12 MONTHS, THE FOOD YOU BOUGHT JUST DIDN'T LAST AND YOU DIDN'T HAVE MONEY TO GET MORE.: NEVER TRUE

## 2023-03-21 SDOH — ECONOMIC STABILITY: FOOD INSECURITY: WITHIN THE PAST 12 MONTHS, YOU WORRIED THAT YOUR FOOD WOULD RUN OUT BEFORE YOU GOT MONEY TO BUY MORE.: NEVER TRUE

## 2023-03-21 ASSESSMENT — PATIENT HEALTH QUESTIONNAIRE - PHQ9
2. FEELING DOWN, DEPRESSED OR HOPELESS: 0
1. LITTLE INTEREST OR PLEASURE IN DOING THINGS: 0
SUM OF ALL RESPONSES TO PHQ QUESTIONS 1-9: 0
SUM OF ALL RESPONSES TO PHQ9 QUESTIONS 1 & 2: 0
SUM OF ALL RESPONSES TO PHQ QUESTIONS 1-9: 0

## 2023-03-21 ASSESSMENT — LIFESTYLE VARIABLES
HOW OFTEN DO YOU HAVE A DRINK CONTAINING ALCOHOL: NEVER
HOW MANY STANDARD DRINKS CONTAINING ALCOHOL DO YOU HAVE ON A TYPICAL DAY: PATIENT DOES NOT DRINK

## 2023-03-21 NOTE — PROGRESS NOTES
Beatriz Morales is a 52 y.o. female who presents today for     Chief Complaint   Patient presents with    Other     Right index finger, not sure if is infected, gets numb and turns blue when cold, when hot painful x 1 month        Lesion, Right Index Finger:  Patient noticed this lesion at the tip of her right index finger which has been present for a month. She reports that it will periodically get numb and turn blue with cold temperature conditions, but will get painful when temperature conditions are hot. This has been going on for 1 month. She denies fever or chills. She does have a history of lupus. She had been under the care of of a rheumatologist in [place] for a number of years; however, this rheumatologist has retired and she is in need of referral to a new rheumatologist    Neck and Back Pain:  Present for approximately a week. She noted the symptoms upon arising in the morning. There are areas on her neck and her low back that she describes as having a \"knot\". These symptoms are bilateral but are predominantly on the right side. She also reports that she seems to be getting some radicular-like symptoms radiating into her right upper extremity and her right lower extremity. Patient tried some over-the-counter medications for relief. Patient has been taking high-dose ibuprofen for her above symptoms. Needs lab work to ensure safety of current treatment. She is requesting a trial of a muscle relaxer    [address]:  Patient's past medical, surgical, social and/or family history reviewed, updated in chart, and are non-contributory (unless otherwise stated). Medications and allergies also reviewed and updated in chart. Review of Systems  Review of Systems   Musculoskeletal:  Positive for back pain and myalgias.         3/21/2023: Radicular symptoms of right upper extremity and right lower extremity   Skin:         3/21/2023: Presence of a small lesion on the tip of her right index finger

## 2023-03-21 NOTE — Clinical Note
Good afternoon--new referral:  This patient has a history of SLE with complications. She is currently experiencing signs/symptoms of vascular phenomena of hands and feet. Previously followed by Rheumatology @ [facility]; previous rheumatologist has retired. Symptoms are mild at this time, but is there any way she can be scheduled at the earliest convenience?   Thank you for your considerations

## 2023-03-22 ENCOUNTER — HOSPITAL ENCOUNTER (OUTPATIENT)
Age: 48
Discharge: HOME | End: 2023-03-22
Payer: COMMERCIAL

## 2023-03-22 DIAGNOSIS — D61.818 PANCYTOPENIA (HCC): ICD-10-CM

## 2023-03-22 DIAGNOSIS — D47.3 IDIOPATHIC THROMBOCYTHEMIA (HCC): ICD-10-CM

## 2023-03-22 DIAGNOSIS — Z79.1 ENCOUNTER FOR MONITORING CHRONIC NSAID THERAPY: ICD-10-CM

## 2023-03-22 DIAGNOSIS — Z51.81 ENCOUNTER FOR MONITORING CHRONIC NSAID THERAPY: ICD-10-CM

## 2023-03-22 DIAGNOSIS — M54.12 CERVICAL RADICULOPATHY: ICD-10-CM

## 2023-03-22 DIAGNOSIS — M54.16 LUMBAR RADICULOPATHY: ICD-10-CM

## 2023-03-22 DIAGNOSIS — M62.830 MUSCLE SPASM OF BACK: ICD-10-CM

## 2023-03-22 LAB
ALBUMIN SERPL-MCNC: 4 G/DL (ref 3.5–5.2)
ALP SERPL-CCNC: 64 U/L (ref 35–104)
ALT SERPL-CCNC: 9 U/L (ref 0–32)
ANION GAP SERPL CALCULATED.3IONS-SCNC: 11 MMOL/L (ref 7–16)
ANISOCYTOSIS: ABNORMAL
AST SERPL-CCNC: 14 U/L (ref 0–31)
BASOPHILS # BLD: 0 E9/L (ref 0–0.2)
BASOPHILS NFR BLD: 0.2 % (ref 0–2)
BILIRUB SERPL-MCNC: 0.4 MG/DL (ref 0–1.2)
BUN SERPL-MCNC: 13 MG/DL (ref 6–20)
CALCIUM SERPL-MCNC: 9.2 MG/DL (ref 8.6–10.2)
CHLORIDE SERPL-SCNC: 107 MMOL/L (ref 98–107)
CO2 SERPL-SCNC: 24 MMOL/L (ref 22–29)
CREAT SERPL-MCNC: 0.8 MG/DL (ref 0.5–1)
EOSINOPHIL # BLD: 0 E9/L (ref 0.05–0.5)
EOSINOPHIL NFR BLD: 1.1 % (ref 0–6)
ERYTHROCYTE [DISTWIDTH] IN BLOOD BY AUTOMATED COUNT: 15.9 FL (ref 11.5–15)
GLUCOSE SERPL-MCNC: 93 MG/DL (ref 74–99)
HCT VFR BLD AUTO: 37 % (ref 34–48)
HGB BLD-MCNC: 11.5 G/DL (ref 11.5–15.5)
HYPOCHROMIA: ABNORMAL
LYMPHOCYTES # BLD: 2.99 E9/L (ref 1.5–4)
LYMPHOCYTES NFR BLD: 67.8 % (ref 20–42)
MCH RBC QN AUTO: 29.9 PG (ref 26–35)
MCHC RBC AUTO-ENTMCNC: 31.1 % (ref 32–34.5)
MCV RBC AUTO: 96.4 FL (ref 80–99.9)
MONOCYTES # BLD: 0.18 E9/L (ref 0.1–0.95)
MONOCYTES NFR BLD: 4.4 % (ref 2–12)
NEUTROPHILS # BLD: 1.23 E9/L (ref 1.8–7.3)
NEUTS SEG NFR BLD: 27.8 % (ref 43–80)
OVALOCYTES: ABNORMAL
PLATELET # BLD AUTO: 56 E9/L (ref 130–450)
PLATELET CONFIRMATION: NORMAL
PMV BLD AUTO: 10.4 FL (ref 7–12)
POIKILOCYTES: ABNORMAL
POTASSIUM SERPL-SCNC: 3.6 MMOL/L (ref 3.5–5)
PROT SERPL-MCNC: 7.9 G/DL (ref 6.4–8.3)
RBC # BLD AUTO: 3.84 E12/L (ref 3.5–5.5)
SMUDGE CELLS: ABNORMAL
SODIUM SERPL-SCNC: 142 MMOL/L (ref 132–146)
TARGET CELLS: ABNORMAL
WBC # BLD: 4.4 E9/L (ref 4.5–11.5)

## 2023-03-22 PROCEDURE — 36415 COLL VENOUS BLD VENIPUNCTURE: CPT

## 2023-03-22 PROCEDURE — 85025 COMPLETE CBC W/AUTO DIFF WBC: CPT

## 2023-03-22 PROCEDURE — 80053 COMPREHEN METABOLIC PANEL: CPT

## 2023-03-23 ENCOUNTER — TELEPHONE (OUTPATIENT)
Dept: RHEUMATOLOGY | Age: 48
End: 2023-03-23

## 2023-03-23 ASSESSMENT — ENCOUNTER SYMPTOMS: BACK PAIN: 1

## 2023-03-23 NOTE — TELEPHONE ENCOUNTER
2 messages left on patient's phone to call the office back to make new patient consult appt. Would like to place patient on 4/3 schedule if possible. Waiting for patient to call back.     Electronically signed by Leslie Coppola CMA on 3/23/2023 at 1:14 PM

## 2023-03-23 NOTE — TELEPHONE ENCOUNTER
Spoke with patient and offered for her to come in on 4/3, patient states that she can only come to the office on Tuesdays. Patient is scheduled for 4/25 at 0800.     Electronically signed by Vandana Melendez CMA on 3/23/2023 at 3:22 PM

## 2023-06-27 ENCOUNTER — OFFICE VISIT (OUTPATIENT)
Dept: FAMILY MEDICINE CLINIC | Age: 48
End: 2023-06-27
Payer: COMMERCIAL

## 2023-06-27 VITALS
DIASTOLIC BLOOD PRESSURE: 76 MMHG | TEMPERATURE: 97.6 F | WEIGHT: 155 LBS | RESPIRATION RATE: 16 BRPM | OXYGEN SATURATION: 98 % | HEIGHT: 64 IN | SYSTOLIC BLOOD PRESSURE: 130 MMHG | BODY MASS INDEX: 26.46 KG/M2 | HEART RATE: 97 BPM

## 2023-06-27 DIAGNOSIS — M79.7 FIBROMYALGIA: ICD-10-CM

## 2023-06-27 DIAGNOSIS — F41.9 ANXIETY AND DEPRESSION: ICD-10-CM

## 2023-06-27 DIAGNOSIS — F32.A ANXIETY AND DEPRESSION: ICD-10-CM

## 2023-06-27 DIAGNOSIS — Z00.00 ENCOUNTER FOR WELL ADULT EXAM WITHOUT ABNORMAL FINDINGS: Primary | ICD-10-CM

## 2023-06-27 DIAGNOSIS — M48.02 SPINAL STENOSIS IN CERVICAL REGION: ICD-10-CM

## 2023-06-27 DIAGNOSIS — G43.411 INTRACTABLE HEMIPLEGIC MIGRAINE WITH STATUS MIGRAINOSUS: ICD-10-CM

## 2023-06-27 DIAGNOSIS — Z71.89 ACP (ADVANCE CARE PLANNING): ICD-10-CM

## 2023-06-27 DIAGNOSIS — F43.9 STRESS: ICD-10-CM

## 2023-06-27 PROCEDURE — 99396 PREV VISIT EST AGE 40-64: CPT | Performed by: FAMILY MEDICINE

## 2023-06-27 PROCEDURE — 99497 ADVNCD CARE PLAN 30 MIN: CPT | Performed by: FAMILY MEDICINE

## 2023-06-27 RX ORDER — IBUPROFEN 800 MG/1
800 TABLET ORAL 3 TIMES DAILY PRN
Qty: 270 TABLET | Refills: 3 | Status: SHIPPED | OUTPATIENT
Start: 2023-06-27 | End: 2024-06-26

## 2023-06-27 ASSESSMENT — ENCOUNTER SYMPTOMS
SORE THROAT: 0
ABDOMINAL PAIN: 0
NAUSEA: 0
SHORTNESS OF BREATH: 0
WHEEZING: 0
BACK PAIN: 1
VOMITING: 0
CONSTIPATION: 0
COUGH: 0
CHEST TIGHTNESS: 1
BLOOD IN STOOL: 0
DIARRHEA: 0

## 2023-06-28 ENCOUNTER — CLINICAL DOCUMENTATION (OUTPATIENT)
Dept: SPIRITUAL SERVICES | Age: 48
End: 2023-06-28

## 2023-07-17 NOTE — PROGRESS NOTES
Advance Care Planning   Ambulatory ACP Specialist Patient Outreach    Date:  6/28/2023  ACP Specialist:  Lexii Leslie    Outreach call to patient in follow-up to ACP Specialist referral from: Maryjo Marvin MD    [x] PCP  [] Provider   [] Ambulatory Care Management [] Other for Reason:    [x] Advance Directive Assistance  [] Code Status Discussion  [] Complete Portable DNR Order  [] Discuss Goals of Care  [] Complete POST/MOST  [] Early ACP Decision-Making  [] Other    Date Referral Received:7/17/23    Today's Outreach:  [] First   [] Second  [x] Third                               Third outreach made by []  phone  [] email []   [employer]     Intervention:  [] Spoke with Patient  [] Left VM requesting return call      Outcome: Closing referral as Khari Hale has missed her scheduled appointment without calling. Next Step:   [] ACP scheduled conversation  [] Outreach again in one week               [] Email / Mail ACP Info Sheets  [] Email / Mail Advance Directive            [x] Close Referral. Routing closure to referring provider/staff and to ACP Specialist . [] Closure Letter mailed to Patient with Invitation to Contact ACP Specialist if/when ready.     Thank you for this referral.

## 2023-08-22 ENCOUNTER — OFFICE VISIT (OUTPATIENT)
Dept: FAMILY MEDICINE CLINIC | Age: 48
End: 2023-08-22
Payer: COMMERCIAL

## 2023-08-22 VITALS
WEIGHT: 155 LBS | HEIGHT: 64 IN | RESPIRATION RATE: 17 BRPM | BODY MASS INDEX: 26.46 KG/M2 | DIASTOLIC BLOOD PRESSURE: 87 MMHG | HEART RATE: 76 BPM | SYSTOLIC BLOOD PRESSURE: 138 MMHG | TEMPERATURE: 97.6 F | OXYGEN SATURATION: 98 %

## 2023-08-22 DIAGNOSIS — R21 RASH AND NONSPECIFIC SKIN ERUPTION: Primary | ICD-10-CM

## 2023-08-22 PROCEDURE — 96372 THER/PROPH/DIAG INJ SC/IM: CPT

## 2023-08-22 PROCEDURE — 99213 OFFICE O/P EST LOW 20 MIN: CPT

## 2023-08-22 RX ORDER — TRIAMCINOLONE ACETONIDE 40 MG/ML
40 INJECTION, SUSPENSION INTRA-ARTICULAR; INTRAMUSCULAR ONCE
Status: COMPLETED | OUTPATIENT
Start: 2023-08-22 | End: 2023-08-22

## 2023-08-22 RX ORDER — LORATADINE 10 MG/1
10 TABLET ORAL DAILY
Qty: 30 TABLET | Refills: 0 | Status: SHIPPED | OUTPATIENT
Start: 2023-08-22

## 2023-08-22 RX ORDER — METHYLPREDNISOLONE 4 MG/1
TABLET ORAL
Qty: 1 KIT | Refills: 0 | Status: SHIPPED | OUTPATIENT
Start: 2023-08-22

## 2023-08-22 RX ADMIN — TRIAMCINOLONE ACETONIDE 40 MG: 40 INJECTION, SUSPENSION INTRA-ARTICULAR; INTRAMUSCULAR at 17:14

## 2023-11-22 ENCOUNTER — OFFICE VISIT (OUTPATIENT)
Dept: FAMILY MEDICINE CLINIC | Age: 48
End: 2023-11-22
Payer: COMMERCIAL

## 2023-11-22 VITALS
TEMPERATURE: 98.5 F | SYSTOLIC BLOOD PRESSURE: 148 MMHG | DIASTOLIC BLOOD PRESSURE: 88 MMHG | RESPIRATION RATE: 16 BRPM | HEART RATE: 117 BPM | WEIGHT: 151.3 LBS | BODY MASS INDEX: 25.83 KG/M2 | HEIGHT: 64 IN | OXYGEN SATURATION: 97 %

## 2023-11-22 DIAGNOSIS — R10.9 FLANK PAIN: ICD-10-CM

## 2023-11-22 DIAGNOSIS — K04.7 DENTAL INFECTION: Primary | ICD-10-CM

## 2023-11-22 LAB
BACTERIA: ABNORMAL
BILIRUBIN URINE: NEGATIVE
COLOR: YELLOW
EPITHELIAL CELLS UA: ABNORMAL /HPF
GLUCOSE URINE: NEGATIVE MG/DL
KETONES, URINE: NEGATIVE MG/DL
LEUKOCYTE ESTERASE, URINE: NEGATIVE
NITRITE, URINE: NEGATIVE
PH UA: 6 (ref 5–9)
PROTEIN UA: NEGATIVE MG/DL
RBC UA: ABNORMAL /HPF
SPECIFIC GRAVITY UA: >1.03 (ref 1–1.03)
TURBIDITY: ABNORMAL
URINE HGB: NEGATIVE
UROBILINOGEN, URINE: 0.2 EU/DL (ref 0–1)
WBC UA: ABNORMAL /HPF

## 2023-11-22 PROCEDURE — 99214 OFFICE O/P EST MOD 30 MIN: CPT | Performed by: FAMILY MEDICINE

## 2023-11-22 RX ORDER — TIZANIDINE 2 MG/1
2 TABLET ORAL NIGHTLY PRN
Qty: 30 TABLET | Refills: 0 | Status: SHIPPED | OUTPATIENT
Start: 2023-11-22

## 2023-11-22 RX ORDER — HYDROXYCHLOROQUINE SULFATE 200 MG/1
TABLET, FILM COATED ORAL 2 TIMES DAILY
COMMUNITY

## 2023-11-22 RX ORDER — PREDNISONE 5 MG/1
5 TABLET ORAL DAILY
COMMUNITY

## 2023-11-22 RX ORDER — AMOXICILLIN AND CLAVULANATE POTASSIUM 875; 125 MG/1; MG/1
1 TABLET, FILM COATED ORAL 2 TIMES DAILY
Qty: 20 TABLET | Refills: 0 | Status: SHIPPED | OUTPATIENT
Start: 2023-11-22 | End: 2023-12-02

## 2023-11-22 RX ORDER — MYCOPHENOLIC ACID 360 MG/1
360 TABLET, DELAYED RELEASE ORAL 2 TIMES DAILY
COMMUNITY

## 2023-11-22 RX ORDER — TRAZODONE HYDROCHLORIDE 50 MG/1
50 TABLET ORAL NIGHTLY
COMMUNITY

## 2023-11-22 NOTE — PROGRESS NOTES
[facility], [address] [place] [address], [address] [place] [address]  Phone: [phone]        Patient:  William Day 50 y.o. female          Chief complaint:   Chief Complaint   Patient presents with    Dental Pain     Pt states she believes she has a tooth infection. Pt is unable to get in with her dentist    Back Pain     Pt c/o mid-low back pain on right side of lower rib area. Pt states she has been using TENS unit with no relief. Pt states pain gets worse with throughout the day. Pt describes pain as kneading. Assessment and Plan     Bryan Mack was seen today for dental pain and back pain. Diagnoses and all orders for this visit:    Dental infection  Acute and not stable  Awaiting appt with dentist for LL tooth removal  To go to ER if fever or worsening swelling develops  -     amoxicillin-clavulanate (AUGMENTIN) 875-125 MG per tablet; Take 1 tablet by mouth 2 times daily for 10 days    Flank pain  Chronic and not well controlled  No dysuria  Could be muscular strain vs less likely pyelonephritis  Tender at muscular level without other signs of pyelonephritis  -     Urinalysis with Microscopic; Future  -     tiZANidine (ZANAFLEX) 2 MG tablet; Take 1 tablet by mouth nightly as needed (muscle spasm)  Patient to call if dysuria develops, fever, chills, or worsening pain        Please see Patient Instructions for further counseling and information given. Advised to please be adherent to the treatment plans discussed today, and please call with any questions or concerns, letting the office know of any reasons that the plans may not be followed. The risks of untreated conditions include worsening illness, injury, disability, and possibly, death. Please call if symptoms change in any way, worsen, or fail to completely resolve, as this could necessitate a change to treatment plans. Patient and/or caregiver expressed understanding.       Indications and proper use of medication(s)

## 2023-12-13 ENCOUNTER — OFFICE VISIT (OUTPATIENT)
Dept: FAMILY MEDICINE CLINIC | Age: 48
End: 2023-12-13
Payer: COMMERCIAL

## 2023-12-13 VITALS
BODY MASS INDEX: 26.12 KG/M2 | WEIGHT: 153 LBS | RESPIRATION RATE: 19 BRPM | HEIGHT: 64 IN | HEART RATE: 89 BPM | DIASTOLIC BLOOD PRESSURE: 80 MMHG | SYSTOLIC BLOOD PRESSURE: 130 MMHG | OXYGEN SATURATION: 98 % | TEMPERATURE: 97.6 F

## 2023-12-13 DIAGNOSIS — R42 VERTIGO: Primary | ICD-10-CM

## 2023-12-13 DIAGNOSIS — A60.04 TYPE 2 HSV INFECTION OF VULVOVAGINAL REGION: ICD-10-CM

## 2023-12-13 PROCEDURE — 99215 OFFICE O/P EST HI 40 MIN: CPT | Performed by: FAMILY MEDICINE

## 2023-12-13 RX ORDER — SULFAMETHOXAZOLE AND TRIMETHOPRIM 800; 160 MG/1; MG/1
1 TABLET ORAL
COMMUNITY
Start: 2023-12-08 | End: 2024-01-19

## 2023-12-13 RX ORDER — ACYCLOVIR 200 MG/1
200 CAPSULE ORAL 2 TIMES DAILY
Qty: 180 CAPSULE | Refills: 3 | Status: SHIPPED | OUTPATIENT
Start: 2023-12-13 | End: 2024-12-13

## 2023-12-13 ASSESSMENT — ENCOUNTER SYMPTOMS
SORE THROAT: 0
ABDOMINAL PAIN: 0
COUGH: 0
BACK PAIN: 1
WHEEZING: 0
BLOOD IN STOOL: 0
CHEST TIGHTNESS: 1
SHORTNESS OF BREATH: 0
VOMITING: 0
DIARRHEA: 0
CONSTIPATION: 0
NAUSEA: 0

## 2023-12-13 NOTE — PROGRESS NOTES
reviewing previous notes, test results and face to face (virtual) with the patient discussing the diagnosis and importance of compliance with the treatment plan as well as documenting on the day of the visit. Follow Up:  Return if symptoms worsen or fail to improve, for Keep scheduled appointment(s). or sooner if necessary. Call or go to ED immediately if symptoms worsen or persist.    Educational materials and/or home exercises printed for patient's review and were included in patient instructions on his/her AfterVisit Summary and given to patient at the end of visit. Counseled regarding above diagnosis,including possible risks and complications,  especially if left uncontrolled. Counseled regarding the possible side effects, risks, benefits and alternatives to treatment; patient and/or guardian verbalizes understanding, agrees, feels comfortable with and wishes to proceed with above treatment plan. Advised patient tocall with any new medication issues, and read all Rx info from pharmacy to assureaware of all possible risks and side effects of medication before taking. Reviewed age and gender appropriate health screening exams and vaccinations. Advisedpatient regarding importance of keeping up with recommended health maintenance andto schedule as soon as possible if overdue, as this is important in assessing forundiagnosed pathology, especially cancer, as well as protecting against potentially harmful/life threatening disease. Patient and/or guardian verbalizes understandingand agrees with above counseling, assessment and plan. All questions answered.     Halima Trejo MD on 12/13/23

## 2024-01-11 DIAGNOSIS — R42 DIZZINESS: ICD-10-CM

## 2024-01-11 RX ORDER — MECLIZINE HYDROCHLORIDE 25 MG/1
TABLET ORAL
Status: CANCELLED | OUTPATIENT
Start: 2024-01-11

## 2024-01-11 RX ORDER — MECLIZINE HYDROCHLORIDE 25 MG/1
TABLET ORAL
Qty: 30 TABLET | Refills: 2 | Status: SHIPPED | OUTPATIENT
Start: 2024-01-11 | End: 2025-01-11

## 2024-01-11 NOTE — TELEPHONE ENCOUNTER
Patient called for a refill of Meclizine.  I asked patient who prescribed it since it's noted Historical Provider and patient stated, Dr. Bowles.     Last seen 12/13/2023  Next appt 7/9/2024  Ubaldo/Keysha

## 2024-02-13 ENCOUNTER — TELEPHONE (OUTPATIENT)
Dept: PULMONOLOGY | Age: 49
End: 2024-02-13

## 2024-03-12 ENCOUNTER — OFFICE VISIT (OUTPATIENT)
Dept: FAMILY MEDICINE CLINIC | Age: 49
End: 2024-03-12

## 2024-03-12 VITALS
HEIGHT: 64 IN | TEMPERATURE: 97.6 F | OXYGEN SATURATION: 98 % | DIASTOLIC BLOOD PRESSURE: 82 MMHG | RESPIRATION RATE: 18 BRPM | SYSTOLIC BLOOD PRESSURE: 128 MMHG | WEIGHT: 153 LBS | HEART RATE: 100 BPM | BODY MASS INDEX: 26.12 KG/M2

## 2024-03-12 DIAGNOSIS — J02.9 SORE THROAT: Primary | ICD-10-CM

## 2024-03-12 DIAGNOSIS — R52 BODY ACHES: ICD-10-CM

## 2024-03-12 PROCEDURE — 99213 OFFICE O/P EST LOW 20 MIN: CPT

## 2024-03-12 RX ORDER — AZITHROMYCIN 250 MG/1
TABLET, FILM COATED ORAL
Qty: 6 TABLET | Refills: 0 | Status: SHIPPED | OUTPATIENT
Start: 2024-03-12

## 2024-03-12 ASSESSMENT — PATIENT HEALTH QUESTIONNAIRE - PHQ9
10. IF YOU CHECKED OFF ANY PROBLEMS, HOW DIFFICULT HAVE THESE PROBLEMS MADE IT FOR YOU TO DO YOUR WORK, TAKE CARE OF THINGS AT HOME, OR GET ALONG WITH OTHER PEOPLE: 0
3. TROUBLE FALLING OR STAYING ASLEEP: 0
5. POOR APPETITE OR OVEREATING: 0
SUM OF ALL RESPONSES TO PHQ QUESTIONS 1-9: 0
7. TROUBLE CONCENTRATING ON THINGS, SUCH AS READING THE NEWSPAPER OR WATCHING TELEVISION: 0
8. MOVING OR SPEAKING SO SLOWLY THAT OTHER PEOPLE COULD HAVE NOTICED. OR THE OPPOSITE, BEING SO FIGETY OR RESTLESS THAT YOU HAVE BEEN MOVING AROUND A LOT MORE THAN USUAL: 0
2. FEELING DOWN, DEPRESSED OR HOPELESS: 0
4. FEELING TIRED OR HAVING LITTLE ENERGY: 0
SUM OF ALL RESPONSES TO PHQ QUESTIONS 1-9: 0
SUM OF ALL RESPONSES TO PHQ QUESTIONS 1-9: 0
6. FEELING BAD ABOUT YOURSELF - OR THAT YOU ARE A FAILURE OR HAVE LET YOURSELF OR YOUR FAMILY DOWN: 0
1. LITTLE INTEREST OR PLEASURE IN DOING THINGS: 0
SUM OF ALL RESPONSES TO PHQ9 QUESTIONS 1 & 2: 0
DEPRESSION UNABLE TO ASSESS: FUNCTIONAL CAPACITY MOTIVATION LIMITS ACCURACY
9. THOUGHTS THAT YOU WOULD BE BETTER OFF DEAD, OR OF HURTING YOURSELF: 0
SUM OF ALL RESPONSES TO PHQ QUESTIONS 1-9: 0

## 2024-03-12 NOTE — PROGRESS NOTES
3/12/24  Savanah Lawson : 1975 Sex: female  Age 48 y.o.    Subjective:  Chief Complaint   Patient presents with    Pharyngitis     Body aches has lupus        HPI:   Savanah Lawson , 48 y.o. female presents to the clinic for evaluation of sore throat x 4 days. The patient also reports body aches. The patient has taken Tylenol for symptoms. The patient reports worsening symptoms over time. The patient denies ill exposure. Patient states that she believes that she is having a flare of Lupus and declined Flu testing at this time.  The patient denies acute loss of taste and smell, headache, sinus congestion, cough, rash, and fever. The patient also denies chest pain, abdominal pain, shortness of breath, wheezing, and nausea / vomiting / diarrhea.    ROS:   Unless otherwise stated in this report the patient's positive and negative responses for review of systems for constitutional, eyes, ENT, cardiovascular, respiratory, gastrointestinal, neurological, , musculoskeletal, and integument systems and related systems to the presenting problem are either stated in the history of present illness or were not pertinent or were negative for the symptoms and/or complaints related to the presenting medical problem.  Positives and pertinent negatives as per HPI.  All others reviewed and are negative.      PMH:     Past Medical History:   Diagnosis Date    Anxiety and depression 2023    Fibroid tumor     Herpes     Idiopathic thrombocythemia (HCC) 2019    Lupus (HCC) 2000    Raynaud disease        Past Surgical History:   Procedure Laterality Date    COLONOSCOPY      DILATION AND CURETTAGE OF UTERUS      ENDOSCOPY, COLON, DIAGNOSTIC      HYSTERECTOMY (CERVIX STATUS UNKNOWN)  4/15/2015    bilateral salpingectomy    TUBAL LIGATION         Family History   Problem Relation Age of Onset    Cancer Maternal Grandfather        Medications:     Current Outpatient Medications:     azithromycin (ZITHROMAX Z-CRISTI)

## 2024-06-25 DIAGNOSIS — G43.411 INTRACTABLE HEMIPLEGIC MIGRAINE WITH STATUS MIGRAINOSUS: ICD-10-CM

## 2024-06-25 DIAGNOSIS — M79.7 FIBROMYALGIA: ICD-10-CM

## 2024-06-25 DIAGNOSIS — M48.02 SPINAL STENOSIS IN CERVICAL REGION: ICD-10-CM

## 2024-06-26 RX ORDER — IBUPROFEN 800 MG/1
800 TABLET ORAL
Qty: 270 TABLET | Refills: 3 | OUTPATIENT
Start: 2024-06-26

## 2024-07-09 ENCOUNTER — OFFICE VISIT (OUTPATIENT)
Dept: FAMILY MEDICINE CLINIC | Age: 49
End: 2024-07-09
Payer: MEDICARE

## 2024-07-09 VITALS
HEART RATE: 92 BPM | OXYGEN SATURATION: 90 % | HEIGHT: 64 IN | DIASTOLIC BLOOD PRESSURE: 80 MMHG | SYSTOLIC BLOOD PRESSURE: 138 MMHG | RESPIRATION RATE: 16 BRPM | BODY MASS INDEX: 28.68 KG/M2 | TEMPERATURE: 97.6 F | WEIGHT: 168 LBS

## 2024-07-09 DIAGNOSIS — D61.818 PANCYTOPENIA (HCC): ICD-10-CM

## 2024-07-09 DIAGNOSIS — G43.411 INTRACTABLE HEMIPLEGIC MIGRAINE WITH STATUS MIGRAINOSUS: ICD-10-CM

## 2024-07-09 DIAGNOSIS — M79.7 FIBROMYALGIA: ICD-10-CM

## 2024-07-09 DIAGNOSIS — R42 DIZZINESS: ICD-10-CM

## 2024-07-09 DIAGNOSIS — M48.02 SPINAL STENOSIS IN CERVICAL REGION: ICD-10-CM

## 2024-07-09 DIAGNOSIS — M51.16 LUMBAR DISC DISEASE WITH RADICULOPATHY: ICD-10-CM

## 2024-07-09 DIAGNOSIS — D47.3 IDIOPATHIC THROMBOCYTHEMIA (HCC): ICD-10-CM

## 2024-07-09 DIAGNOSIS — M32.9 SLE (SYSTEMIC LUPUS ERYTHEMATOSUS RELATED SYNDROME) (HCC): ICD-10-CM

## 2024-07-09 DIAGNOSIS — M50.10 CERVICAL DISC DISORDER WITH RADICULOPATHY: Primary | ICD-10-CM

## 2024-07-09 PROCEDURE — 1036F TOBACCO NON-USER: CPT | Performed by: FAMILY MEDICINE

## 2024-07-09 PROCEDURE — 99215 OFFICE O/P EST HI 40 MIN: CPT | Performed by: FAMILY MEDICINE

## 2024-07-09 PROCEDURE — G8419 CALC BMI OUT NRM PARAM NOF/U: HCPCS | Performed by: FAMILY MEDICINE

## 2024-07-09 PROCEDURE — G8427 DOCREV CUR MEDS BY ELIG CLIN: HCPCS | Performed by: FAMILY MEDICINE

## 2024-07-09 PROCEDURE — G2211 COMPLEX E/M VISIT ADD ON: HCPCS | Performed by: FAMILY MEDICINE

## 2024-07-09 RX ORDER — IBUPROFEN 800 MG/1
800 TABLET ORAL 3 TIMES DAILY PRN
Qty: 90 TABLET | Refills: 2 | Status: SHIPPED | OUTPATIENT
Start: 2024-07-09 | End: 2025-07-09

## 2024-07-09 RX ORDER — ARIPIPRAZOLE 10 MG/1
TABLET ORAL
COMMUNITY
Start: 2024-07-08

## 2024-07-09 RX ORDER — TIZANIDINE 2 MG/1
2 TABLET ORAL 2 TIMES DAILY PRN
Qty: 30 TABLET | Refills: 2 | Status: SHIPPED | OUTPATIENT
Start: 2024-07-09

## 2024-07-09 RX ORDER — GABAPENTIN 100 MG/1
CAPSULE ORAL
Qty: 90 CAPSULE | Refills: 11 | Status: SHIPPED | OUTPATIENT
Start: 2024-07-09 | End: 2025-07-09

## 2024-07-09 RX ORDER — PREDNISONE 10 MG/1
TABLET ORAL
Qty: 30 TABLET | Refills: 0 | Status: SHIPPED | OUTPATIENT
Start: 2024-07-09

## 2024-07-09 RX ORDER — MECLIZINE HYDROCHLORIDE 25 MG/1
TABLET ORAL
Qty: 30 TABLET | Refills: 2 | Status: SHIPPED | OUTPATIENT
Start: 2024-07-09 | End: 2025-07-10

## 2024-07-09 SDOH — ECONOMIC STABILITY: INCOME INSECURITY: HOW HARD IS IT FOR YOU TO PAY FOR THE VERY BASICS LIKE FOOD, HOUSING, MEDICAL CARE, AND HEATING?: HARD

## 2024-07-09 SDOH — ECONOMIC STABILITY: FOOD INSECURITY: WITHIN THE PAST 12 MONTHS, YOU WORRIED THAT YOUR FOOD WOULD RUN OUT BEFORE YOU GOT MONEY TO BUY MORE.: NEVER TRUE

## 2024-07-09 SDOH — ECONOMIC STABILITY: FOOD INSECURITY: WITHIN THE PAST 12 MONTHS, THE FOOD YOU BOUGHT JUST DIDN'T LAST AND YOU DIDN'T HAVE MONEY TO GET MORE.: NEVER TRUE

## 2024-07-09 ASSESSMENT — ENCOUNTER SYMPTOMS
ABDOMINAL PAIN: 0
CONSTIPATION: 0
WHEEZING: 0
SORE THROAT: 0
BLOOD IN STOOL: 0
VOMITING: 0
NAUSEA: 0
SHORTNESS OF BREATH: 0
COUGH: 0
BACK PAIN: 1
DIARRHEA: 0

## 2024-07-09 NOTE — PROGRESS NOTES
Savanah Lawson is a 49 y.o. female who presents today for     Chief Complaint   Patient presents with    Lower Back Pain     Pain on both buttocks and travels down back of legs, right side worse,  both arms get a tingling sensation all way down arms, not at same time but both. Has been going on 3-4 months. Worsening over time. Also feels like something in crawling on skin all the time, like bugs and gets the itching feeling.        Multiple issues to review:    Low Back Pain:  Pain on both buttocks and travels down back of legs, right side worse. EMG ordered by neurologist @ [facility] with evidence of mild radiculopathy.  PT and other conservative measures recommended.      Upper Extremity Symptoms  both arms get a tingling sensation all way down arms, not at same time but both. Has been going on 3-4 months. Worsening over time. She also feels like something in crawling on skin all the time, like bugs and gets the itching feeling.     Chart Reviewed:    MRI C-spine, 3/30/2016:    IMPRESSION:     1. Mild reversal of the normal cervical lordosis centered at C5-C6.  2. No compression fracture or spondylolisthesis.  3. Multilevel moderate degenerative disc disease of the cervical spine  as detailed above.  4. Focal right paracentral disc protrusion at C4-C5, focal left  paracentral disc protrusion at C5-C6,  mild central canal narrowing,  indentation on the anterior aspect of the cervical cord at these  levels as detailed above.         ALERT:  THIS IS AN ABNORMAL REPORT      Myelogram, 5/16/2016:    IMPRESSION:  ALERT:  THIS IS AN ABNORMAL REPORT  1. Small foci of air within the suprasellar cistern and the right  ambient cistern.  2. Multilevel degenerative changes C3-C7 with a right paracentral disc  protrusion at C4-C5, a left paracentral disc protrusion at C5-C6 and a  centrally located disc protrusion at C6-C7.     ALERT:  THIS IS AN ABNORMAL REPORT    Overall, following chart review, she appears to have

## 2024-07-09 NOTE — PATIENT INSTRUCTIONS
Gabapentin (Neurontin) titration:    1) Days 1-3: 100 mg at bedtime; 2) Day 4-6: 100 mg in AM, 100 mg at bedtime; 3) Day 7-9: 100 mg three times a day. Stop when you either reach good symptoms control at lowest effective dose, or the development of side effects                   [employer]*  (Call [employer]/211 if need more resources.)         HELP NETWORK OF [facility]:  What they do: Provides 24-hr, 7 days a week access to information on community resources for financial help. [facility] AND [facility]  Phone: 211 or [phone]    [facility] FAMILY SERVICE: [address], [place] [address]  What they offer: Limited assistance to restore/ prevent utility disconnection.  Phone Number: [phone]  Website: [employer] AND FAMILY SERVICES:  MAIN [facility] LINE FOR ALL [facility]: 1-960.497.2176  What they do: [place] works first with temporary cash assistance if there are children in household.   [facility] DJFS: [address] [place] [address]  Phone: [phone], [phone]  [facility] DJFS: [address] [place] [address]  Phone: [phone]  [facility] DJFS: [address], [place] [address]  Phone: [phone]  Website: s.[place].[facility]    [employer] Financial Assistance  What they offer: Assistance with [employer] bills  Phone: [phone], option #5     Medications:  [facility]  What they offer: [employer] tracks prescription drug prices and provides free drug coupons for discounts on medications.  Website: https://www.[employer]  What they offer: [employer] offers free information on medications and healthcare cost savings programs including prescription assistance programs, coupons, and discount programs.  Helpline: 771.282.9043  Website: https://www.needBehanceeds.org    [employer]ist  What they offer: Information about free and low-cost medicine programs.  Website:

## 2024-07-16 ENCOUNTER — HOSPITAL ENCOUNTER (OUTPATIENT)
Dept: PHYSICAL THERAPY | Age: 49
Setting detail: THERAPIES SERIES
Discharge: HOME | End: 2024-07-16
Payer: MEDICARE

## 2024-07-16 PROCEDURE — 97161 PT EVAL LOW COMPLEX 20 MIN: CPT | Performed by: PHYSICAL THERAPIST

## 2024-07-16 ASSESSMENT — PAIN DESCRIPTION - LOCATION: LOCATION: NECK;BACK

## 2024-07-16 ASSESSMENT — PAIN SCALES - GENERAL: PAINLEVEL_OUTOF10: 7

## 2024-07-16 ASSESSMENT — PAIN DESCRIPTION - DESCRIPTORS: DESCRIPTORS: ACHING;BURNING;NUMBNESS;TINGLING

## 2024-07-16 ASSESSMENT — PAIN DESCRIPTION - ORIENTATION: ORIENTATION: RIGHT;LEFT

## 2024-07-16 ASSESSMENT — PAIN DESCRIPTION - PAIN TYPE: TYPE: CHRONIC PAIN

## 2024-07-16 NOTE — PROGRESS NOTES
[facility]                Phone: [phone]   Fax: [phone]    Physical Therapy Daily Treatment Note  Date:  2024    Patient Name:  Savanah Lawson    :  1975  MRN: [id number]    Evaluating therapist:  AMY Tran      (24)  Restrictions/Precautions:    Diagnosis:  cervical/lumbar disc disorder  Treatment Diagnosis:    Insurance/Certification information:  [employer]          cert dates:  24  to  10/16/24       ICD-10:  M50.10, M51.16   Referring Physician:  ABDULLAHI Bowles  Plan of care signed (Y/N):  Y  Visit# / total visits:  -  Pain level: 7/10   Time In:  Time Out:    Subjective:      Exercises:  Exercise/Equipment Resistance/Repetitions Other comments   StepOne with arms              corner st     upper trap st     thoracic st            shrugs      scap ret     pulleys for flex              tball flex/rot             rot st     SKTC     piriformis st                                   Other Therapeutic Activities:      Home Exercise Program:  provided 24    Manual Treatments:      Modalities:  IFC/MH to neck/LB     Timed Code Treatment Minutes:      Total Treatment Minutes:      Treatment/Activity Tolerance:  [] Patient tolerated treatment well [] Patient limited by fatique  [] Patient limited by pain  [] Patient limited by other medical complications  [] Other:     Prognosis: [] Good [] Fair  [] Poor    Patient Requires Follow-up: [] Yes  [] No    Plan:   [] Continue per plan of care [] Alter current plan (see comments)  [] Plan of care initiated [] Hold pending MD visit [] Discharge  Plan for Next Session:      See Weekly Progress Note: []  Yes  []  No  Next due:        Electronically signed by:  Luis Stuart, ANTWAN   
___________________________   Date:_______                                                                   Kimo Bowles*        Physician Comments: _______________________________________________    Please sign and return to [facility].  Please fax to the location listed below. THANK YOU for this referral!    [facility]  [address]  [place] [address]  Dept: [phone]       POC NOTE

## 2024-07-18 ENCOUNTER — HOSPITAL ENCOUNTER (OUTPATIENT)
Dept: PHYSICAL THERAPY | Age: 49
Setting detail: THERAPIES SERIES
Discharge: HOME | End: 2024-07-18
Payer: MEDICARE

## 2024-07-18 PROCEDURE — 97530 THERAPEUTIC ACTIVITIES: CPT

## 2024-07-18 PROCEDURE — G0283 ELEC STIM OTHER THAN WOUND: HCPCS

## 2024-07-18 PROCEDURE — 97110 THERAPEUTIC EXERCISES: CPT

## 2024-07-18 NOTE — PROGRESS NOTES
[facility]                Phone: [phone]   Fax: [phone]    Physical Therapy Daily Treatment Note      Date:  2024    Patient Name:  Savanah Lawson    :  1975  MRN: [id number]      Evaluating therapist:  AMY Tran      24  Referring Physician:  ABDULLAHI Bowles  Diagnosis:  cervical/lumbar disc disorder  Restrictions/Precautions:  SLE  Insurance/Certification:  [employer]          cert dates:  24  to  10/16/24         ICD-10:  M50.10, M51.16   Plan of care signed (Y/N):  Y  Visit# / total visits:    -  Pain level:  6-7/10     Time In:    825  Time Out:  940    Subjective:    Patient presents for first scheduled treatment session following initial evaluation.    She reports pain 6-7/10  in neck and LB this morning prior to session.      Exercises:  Exercise/Equipment Resistance/Repetitions Other comments   StepOne with arms   6 min            Corner st 4 x20s          Pulleys for flex 5 min          Upper trap st 4 x20s ea     Thoracic st HEP           Shrugs w/ retract 20x    Scap ret 20x          Mid row 2 x15  btb    Horz Abd/ER 2 x10  otb              T-ball flex/rot  5 x10s ea          Supine bridges x15           LTR st 4 x20s ea L/R    SKTC 4 x20s ea L/R    Piriformis st 4 x20s ea L/R                           Objective:   Ther. exercise/activity as listed per flow sheet above.  Treatment completed with PreMod/MH to BL neck/upper trap and LB x 15 minutes.     Assessment:   Patient performs exercises/activities with good effort and fair pacing.    No increased pain reported following session.  She voices good understanding of HEP exercises/stretches.      Evaluation Findings:    c/o neck/LBP for several yrs of insidious onset with increase in symptoms noted over last couple of months   c/o pain across B sides neck/LB with all prolonged activities, 7/10  Posture: forward head/rounded shoulders/B protracted scapulae; level

## 2024-07-23 ENCOUNTER — HOSPITAL ENCOUNTER (OUTPATIENT)
Dept: PHYSICAL THERAPY | Age: 49
Setting detail: THERAPIES SERIES
End: 2024-07-23
Payer: MEDICARE

## 2024-07-25 ENCOUNTER — HOSPITAL ENCOUNTER (OUTPATIENT)
Dept: PHYSICAL THERAPY | Age: 49
Setting detail: THERAPIES SERIES
Discharge: HOME | End: 2024-07-25
Payer: MEDICARE

## 2024-07-25 NOTE — PROGRESS NOTES
[facility]                Phone: [phone]  Fax: [phone]    Physical Therapy  Cancellation/No-show Note      Patient Name:  Savanah Lawson  :  1975   Date:  2024      For today's appointment patient:  []  Cancelled  []  Rescheduled appointment  [x]  No-show     Reason given by patient:  []  Patient ill  []  Conflicting appointment  []  No transportation    []  Conflict with work  []  No reason given  []  Other:     Comments:      Electronically signed by:  SOHEILA BARDALES ATC, PTA [id number]

## 2025-01-09 ENCOUNTER — OFFICE VISIT (OUTPATIENT)
Dept: PRIMARY CARE CLINIC | Age: 50
End: 2025-01-09
Payer: MEDICARE

## 2025-01-09 VITALS
TEMPERATURE: 97.3 F | OXYGEN SATURATION: 99 % | DIASTOLIC BLOOD PRESSURE: 100 MMHG | HEIGHT: 64 IN | BODY MASS INDEX: 28.51 KG/M2 | HEART RATE: 102 BPM | RESPIRATION RATE: 16 BRPM | SYSTOLIC BLOOD PRESSURE: 158 MMHG | WEIGHT: 167 LBS

## 2025-01-09 DIAGNOSIS — H10.31 ACUTE CONJUNCTIVITIS OF RIGHT EYE, UNSPECIFIED ACUTE CONJUNCTIVITIS TYPE: Primary | ICD-10-CM

## 2025-01-09 PROCEDURE — 99213 OFFICE O/P EST LOW 20 MIN: CPT | Performed by: NURSE PRACTITIONER

## 2025-01-09 PROCEDURE — G8427 DOCREV CUR MEDS BY ELIG CLIN: HCPCS | Performed by: NURSE PRACTITIONER

## 2025-01-09 PROCEDURE — G8419 CALC BMI OUT NRM PARAM NOF/U: HCPCS | Performed by: NURSE PRACTITIONER

## 2025-01-09 PROCEDURE — 1036F TOBACCO NON-USER: CPT | Performed by: NURSE PRACTITIONER

## 2025-01-09 RX ORDER — OFLOXACIN 3 MG/ML
1 SOLUTION/ DROPS OPHTHALMIC 4 TIMES DAILY
Qty: 5 ML | Refills: 0 | Status: SHIPPED | OUTPATIENT
Start: 2025-01-09 | End: 2025-01-19

## 2025-01-09 NOTE — PROGRESS NOTES
Chief Complaint:   Conjunctivitis (Started yesterday with right eye redness/pain/crust. Grand Daughter had pink eye. )    History of Present Illness   Savanah Lawson  1975  [facility]     History of Present Illness  The patient is a 49-year-old female who presents for evaluation of possible pink eye.    She reports the onset of symptoms in her right eye yesterday at approximately 3:00 PM. She has been experiencing crusting over the eye upon awakening, accompanied by a sensation of a foreign body in the eye, persistent discomfort, and ocular redness. Additionally, she notes an increase in mucus production. Her granddaughter was diagnosed with walking pneumonia and pink eye last week, and she is now showing symptoms of this.  Denies any blurry vision, double vision or loss of vision. Has been having some URI symptoms recently. Denies any recent fever, chills, N/V/D, chest pain or SOB.     Prior to Visit Medications    Medication Sig Taking? Authorizing Provider   ofloxacin (OCUFLOX) 0.3 % solution Place 1 drop into the right eye 4 times daily for 10 days Yes Víctor Larose APRN - CNP   ARIPiprazole (ABILIFY) 10 MG tablet   ProviderZak MD   ibuprofen (ADVIL;MOTRIN) 800 MG tablet Take 1 tablet by mouth 3 times daily as needed for Pain  Mar oBwles MD   meclizine (ANTIVERT) 25 MG tablet TAKE 1 TABLET BY MOUTH THREE TIMES DAILY AS NEEDED FOR DIZZINESS OR NAUSEA  Mar Bowles MD   tiZANidine (ZANAFLEX) 2 MG tablet Take 1 tablet by mouth 2 times daily as needed (muscle spasms)  Mar Bowles MD   gabapentin (NEURONTIN) 100 MG capsule Gabapentin (Neurontin) titration:1) Days 1-3: 100 mg at bedtime; 2) Day 4-6: 100 mg in AM, 100 mg at bedtime; 3) Day 7-9: 100 mg three times a day. Stop when you either reach good symptoms control at lowest effective dose, or the development of side effects  Mar Bowles MD   predniSONE (DELTASONE) 10 MG

## 2025-01-15 ENCOUNTER — HOSPITAL ENCOUNTER (OUTPATIENT)
Age: 50
Discharge: HOME | End: 2025-01-17
Payer: MEDICARE

## 2025-01-15 ENCOUNTER — OFFICE VISIT (OUTPATIENT)
Dept: FAMILY MEDICINE CLINIC | Age: 50
End: 2025-01-15
Payer: MEDICARE

## 2025-01-15 ENCOUNTER — HOSPITAL ENCOUNTER (OUTPATIENT)
Dept: GENERAL RADIOLOGY | Age: 50
Discharge: HOME | End: 2025-01-17
Payer: MEDICARE

## 2025-01-15 VITALS
SYSTOLIC BLOOD PRESSURE: 122 MMHG | BODY MASS INDEX: 28.51 KG/M2 | HEART RATE: 83 BPM | OXYGEN SATURATION: 98 % | HEIGHT: 64 IN | DIASTOLIC BLOOD PRESSURE: 80 MMHG | RESPIRATION RATE: 19 BRPM | WEIGHT: 167 LBS | TEMPERATURE: 97.7 F

## 2025-01-15 DIAGNOSIS — K08.89 PAIN, DENTAL: ICD-10-CM

## 2025-01-15 DIAGNOSIS — J18.9 PNEUMONIA OF RIGHT LOWER LOBE DUE TO INFECTIOUS ORGANISM: Primary | ICD-10-CM

## 2025-01-15 DIAGNOSIS — R05.9 COUGH, UNSPECIFIED TYPE: ICD-10-CM

## 2025-01-15 PROCEDURE — G8419 CALC BMI OUT NRM PARAM NOF/U: HCPCS

## 2025-01-15 PROCEDURE — 71046 X-RAY EXAM CHEST 2 VIEWS: CPT

## 2025-01-15 PROCEDURE — G8427 DOCREV CUR MEDS BY ELIG CLIN: HCPCS

## 2025-01-15 PROCEDURE — 1036F TOBACCO NON-USER: CPT

## 2025-01-15 PROCEDURE — 99214 OFFICE O/P EST MOD 30 MIN: CPT

## 2025-01-15 RX ORDER — MIRTAZAPINE 7.5 MG/1
TABLET, FILM COATED ORAL
COMMUNITY
Start: 2024-11-30

## 2025-01-15 RX ORDER — PREDNISONE 10 MG/1
TABLET ORAL
Qty: 18 TABLET | Refills: 0 | Status: SHIPPED | OUTPATIENT
Start: 2025-01-15 | End: 2025-01-23

## 2025-01-15 RX ORDER — HYDROXYZINE HYDROCHLORIDE 50 MG/1
TABLET, FILM COATED ORAL
COMMUNITY
Start: 2024-11-30

## 2025-01-15 RX ORDER — AZITHROMYCIN 250 MG/1
TABLET, FILM COATED ORAL
Qty: 6 TABLET | Refills: 0 | Status: SHIPPED | OUTPATIENT
Start: 2025-01-15

## 2025-01-15 RX ORDER — OXCARBAZEPINE 300 MG/1
TABLET, FILM COATED ORAL
COMMUNITY
Start: 2025-01-10

## 2025-01-15 RX ORDER — OXCARBAZEPINE 300 MG/1
TABLET, FILM COATED ORAL
COMMUNITY
Start: 2024-11-30

## 2025-01-15 RX ORDER — ACYCLOVIR 200 MG/1
CAPSULE ORAL
COMMUNITY
Start: 2024-11-30 | End: 2025-01-15

## 2025-01-15 NOTE — PROGRESS NOTES
Chief Complaint       Nasal Congestion (Last wednseday), Ear Pain (Right ear pain and clogged), Pharyngitis (Started on saturday), and Cough (When coughing say's chest and side and back hurts. Coughing up yellow mucous. Started 2 days ago)  dental  History of Present Illness   Source of history provided by:  patient.    History of Present Illness  The patient is a 49-year-old female presenting for evaluation of cough and congestion.    She has been experiencing symptoms for approximately one week, including fatigue, lack of energy, and a productive cough with yellow sputum. The cough is associated with back pain and a new, persistent, knotting pain. She also reports a sore throat, nasal congestion, rhinorrhea, and right-sided otalgia. Additionally, she experiences fevers, chills, and body aches. She has bilateral rib tenderness upon palpation. She suspects she may have walking pneumonia, as her granddaughter was recently diagnosed with the same condition. She also mentions that her granddaughter had conjunctivitis, which she subsequently developed. Her current medication regimen includes NyQuil, DayQuil, Sudafed, ibuprofen, and Tylenol capsules. She also takes prednisone as needed. She has a past medical history of lupus and pleurisy.    She has a tooth infection.    MEDICATIONS  NyQuil, DayQuil, Sudafed, ibuprofen, prednisone    All other ROS negative unless otherwise stated in HPI.      ROS    Unless otherwise stated in this report or unable to obtain because of the patient's clinical or mental status as evidenced by the medical record, this patients's positive and negative responses for Review of Systems, constitutional, psych, eyes, ENT, cardiovascular, respiratory, gastrointestinal, neurological, genitourinary, musculoskeletal, integument systems and systems related to the presenting problem are either stated in the preceding or were not pertinent or were negative for the symptoms and/or complaints related

## 2025-01-29 ENCOUNTER — TELEPHONE (OUTPATIENT)
Dept: FAMILY MEDICINE CLINIC | Age: 50
End: 2025-01-29

## 2025-01-29 NOTE — TELEPHONE ENCOUNTER
I rec'd a call on the Nurse Triage Line stating patient is c/o SOB.  The call was warm transferred and patient stated she's been having SOB when she walks up and down the steps and when she coughs.  She stated she also gets chest pain on her Rt side when she coughs, which is a 6-7 on a scale of 1-10.  She informed me that she finished the ABX for Pneumonia and wants an appt to see Dr. Bowles and to have a Chest Xray to make sure that she doesn't still have Pneumonia.  I advised patient that she should go to the ED due to her symptoms.  Patient stated she will not go to the ED and sit for hours waiting to be seen.  Patient asked me to make her an appt.  I informed her that I will send a msg to Dr. Bowles.     Last seen 7/9/2024  Next appt Visit date not found

## 2025-01-29 NOTE — TELEPHONE ENCOUNTER
I spoke w/Nancy LOPEZ MA who informed Dr. Bowles of patient's call and the advisement given.  Nancy informed me that Dr. Bowles advises patient to go to the ED to be seen due to her symptoms and health history.    I called patient back and informed her that Dr. Bowles advises that she go to the ED.  Patient stated doesn't want to go and wants a msg sent to Dr. Bowles asking her to put an order in for a Chest Xray.  I informed patient that Dr. Bowles wants her to go to the ED where she will be evaluated, X rays done and treated.  Patient stated she is not going to the ED and will just go to a Walk In for a Chest X ray.  Msg routed, for informational purposes.

## 2025-02-18 ENCOUNTER — OFFICE VISIT (OUTPATIENT)
Dept: FAMILY MEDICINE CLINIC | Age: 50
End: 2025-02-18
Payer: MEDICARE

## 2025-02-18 VITALS
HEIGHT: 64 IN | DIASTOLIC BLOOD PRESSURE: 84 MMHG | BODY MASS INDEX: 28.66 KG/M2 | HEART RATE: 98 BPM | TEMPERATURE: 97.9 F | RESPIRATION RATE: 16 BRPM | OXYGEN SATURATION: 97 % | SYSTOLIC BLOOD PRESSURE: 122 MMHG | WEIGHT: 167.9 LBS

## 2025-02-18 DIAGNOSIS — R05.1 ACUTE COUGH: ICD-10-CM

## 2025-02-18 DIAGNOSIS — J02.9 SORE THROAT: Primary | ICD-10-CM

## 2025-02-18 PROCEDURE — 99213 OFFICE O/P EST LOW 20 MIN: CPT | Performed by: NURSE PRACTITIONER

## 2025-02-18 PROCEDURE — G8427 DOCREV CUR MEDS BY ELIG CLIN: HCPCS | Performed by: NURSE PRACTITIONER

## 2025-02-18 PROCEDURE — 1036F TOBACCO NON-USER: CPT | Performed by: NURSE PRACTITIONER

## 2025-02-18 PROCEDURE — G8419 CALC BMI OUT NRM PARAM NOF/U: HCPCS | Performed by: NURSE PRACTITIONER

## 2025-02-18 PROCEDURE — 87880 STREP A ASSAY W/OPTIC: CPT | Performed by: NURSE PRACTITIONER

## 2025-02-18 NOTE — PROGRESS NOTES
25  Savanah Lawson : 1975 Sex: female  Age 49 y.o.    Subjective:  Chief Complaint   Patient presents with    Pharyngitis     2-3 x days diagnosed with walking pneumonia in January, she believes she still has it.    Cough       HPI:   Savanah Lawson , 49 y.o. female presents to the clinic for evaluation of sore throat x 2-3 days. The patient also reports hx of walking pneumonia and with improved but unresolved mild cough. The patient has taken Thera Flu for current symptoms. The patient reports unchanged sore throat over time. The patient denies known ill exposure. Denies headache, sinus congestion, rash, and fever. Denies chest pain, abdominal pain, shortness of breath, wheezing, and nausea / vomiting / diarrhea.    ROS:   Unless otherwise stated in this report the patient's positive and negative responses for review of systems for constitutional, eyes, ENT, cardiovascular, respiratory, gastrointestinal, neurological, , musculoskeletal, and integument systems and related systems to the presenting problem are either stated in the history of present illness or were not pertinent or were negative for the symptoms and/or complaints related to the presenting medical problem.  Positives and pertinent negatives as per HPI.  All others reviewed and are negative.      PMH:     Past Medical History:   Diagnosis Date    Anxiety and depression 2023    Fibroid tumor     Herpes     Idiopathic thrombocythemia (HCC) 2019    Lupus 2000    Raynaud disease        Past Surgical History:   Procedure Laterality Date    COLONOSCOPY      DILATION AND CURETTAGE OF UTERUS      ENDOSCOPY, COLON, DIAGNOSTIC      HYSTERECTOMY (CERVIX STATUS UNKNOWN)  4/15/2015    bilateral salpingectomy    TUBAL LIGATION         Family History   Problem Relation Age of Onset    Cancer Maternal Grandfather        Medications:     Current Outpatient Medications:     hydrOXYzine HCl (ATARAX) 50 MG tablet, , Disp: , Rfl:

## 2025-02-19 LAB — S PYO AG THROAT QL: NORMAL

## 2025-03-14 DIAGNOSIS — R42 DIZZINESS: ICD-10-CM

## 2025-03-16 RX ORDER — MECLIZINE HYDROCHLORIDE 25 MG/1
TABLET ORAL
Qty: 30 TABLET | Refills: 2 | Status: SHIPPED | OUTPATIENT
Start: 2025-03-16

## 2025-05-06 ENCOUNTER — OFFICE VISIT (OUTPATIENT)
Dept: FAMILY MEDICINE CLINIC | Age: 50
End: 2025-05-06
Payer: MEDICARE

## 2025-05-06 VITALS
OXYGEN SATURATION: 95 % | DIASTOLIC BLOOD PRESSURE: 80 MMHG | TEMPERATURE: 97 F | HEIGHT: 64 IN | SYSTOLIC BLOOD PRESSURE: 130 MMHG | RESPIRATION RATE: 18 BRPM | HEART RATE: 81 BPM | BODY MASS INDEX: 29.53 KG/M2 | WEIGHT: 173 LBS

## 2025-05-06 DIAGNOSIS — R42 DIZZINESS: ICD-10-CM

## 2025-05-06 DIAGNOSIS — M51.16 LUMBAR DISC DISEASE WITH RADICULOPATHY: ICD-10-CM

## 2025-05-06 DIAGNOSIS — M50.10 CERVICAL DISC DISORDER WITH RADICULOPATHY: ICD-10-CM

## 2025-05-06 DIAGNOSIS — M48.02 SPINAL STENOSIS IN CERVICAL REGION: ICD-10-CM

## 2025-05-06 DIAGNOSIS — M32.9 SLE (SYSTEMIC LUPUS ERYTHEMATOSUS RELATED SYNDROME) (HCC): ICD-10-CM

## 2025-05-06 DIAGNOSIS — G43.411 INTRACTABLE HEMIPLEGIC MIGRAINE WITH STATUS MIGRAINOSUS: ICD-10-CM

## 2025-05-06 DIAGNOSIS — D61.818 PANCYTOPENIA (HCC): ICD-10-CM

## 2025-05-06 DIAGNOSIS — D47.3 IDIOPATHIC THROMBOCYTHEMIA (HCC): ICD-10-CM

## 2025-05-06 PROCEDURE — G8419 CALC BMI OUT NRM PARAM NOF/U: HCPCS | Performed by: FAMILY MEDICINE

## 2025-05-06 PROCEDURE — G8427 DOCREV CUR MEDS BY ELIG CLIN: HCPCS | Performed by: FAMILY MEDICINE

## 2025-05-06 PROCEDURE — 1036F TOBACCO NON-USER: CPT | Performed by: FAMILY MEDICINE

## 2025-05-06 PROCEDURE — 99214 OFFICE O/P EST MOD 30 MIN: CPT | Performed by: FAMILY MEDICINE

## 2025-05-06 PROCEDURE — G2211 COMPLEX E/M VISIT ADD ON: HCPCS | Performed by: FAMILY MEDICINE

## 2025-05-06 RX ORDER — MYCOPHENOLATE MOFETIL 500 MG/1
TABLET ORAL
COMMUNITY
Start: 2025-05-01

## 2025-05-06 RX ORDER — BUSPIRONE HYDROCHLORIDE 10 MG/1
TABLET ORAL
COMMUNITY
Start: 2025-03-04

## 2025-05-06 RX ORDER — GABAPENTIN 100 MG/1
100 CAPSULE ORAL 3 TIMES DAILY
Qty: 90 CAPSULE | Refills: 11 | Status: SHIPPED | OUTPATIENT
Start: 2025-05-06 | End: 2026-05-06

## 2025-05-06 RX ORDER — PREDNISONE 10 MG/1
TABLET ORAL
COMMUNITY
Start: 2025-03-19

## 2025-05-06 RX ORDER — MECLIZINE HYDROCHLORIDE 25 MG/1
TABLET ORAL
Qty: 30 TABLET | Refills: 5 | Status: SHIPPED | OUTPATIENT
Start: 2025-05-06 | End: 2026-05-06

## 2025-05-06 RX ORDER — IBUPROFEN 800 MG/1
800 TABLET, FILM COATED ORAL 3 TIMES DAILY PRN
Qty: 90 TABLET | Refills: 2 | Status: SHIPPED | OUTPATIENT
Start: 2025-05-06 | End: 2026-05-06

## 2025-05-06 RX ORDER — OLANZAPINE 5 MG/1
TABLET, FILM COATED ORAL
COMMUNITY
Start: 2025-05-01

## 2025-05-06 SDOH — ECONOMIC STABILITY: FOOD INSECURITY: WITHIN THE PAST 12 MONTHS, THE FOOD YOU BOUGHT JUST DIDN'T LAST AND YOU DIDN'T HAVE MONEY TO GET MORE.: NEVER TRUE

## 2025-05-06 SDOH — ECONOMIC STABILITY: FOOD INSECURITY: WITHIN THE PAST 12 MONTHS, YOU WORRIED THAT YOUR FOOD WOULD RUN OUT BEFORE YOU GOT MONEY TO BUY MORE.: NEVER TRUE

## 2025-05-06 ASSESSMENT — ENCOUNTER SYMPTOMS
SORE THROAT: 0
BACK PAIN: 1
CONSTIPATION: 0
ABDOMINAL PAIN: 0
VOMITING: 0
DIARRHEA: 0
COUGH: 0
SHORTNESS OF BREATH: 0
BLOOD IN STOOL: 0
NAUSEA: 0
WHEEZING: 0

## 2025-05-06 ASSESSMENT — PATIENT HEALTH QUESTIONNAIRE - PHQ9
SUM OF ALL RESPONSES TO PHQ QUESTIONS 1-9: 1
SUM OF ALL RESPONSES TO PHQ QUESTIONS 1-9: 1
1. LITTLE INTEREST OR PLEASURE IN DOING THINGS: NOT AT ALL
SUM OF ALL RESPONSES TO PHQ QUESTIONS 1-9: 1
2. FEELING DOWN, DEPRESSED OR HOPELESS: SEVERAL DAYS
3. TROUBLE FALLING OR STAYING ASLEEP: NOT AT ALL
5. POOR APPETITE OR OVEREATING: NOT AT ALL
6. FEELING BAD ABOUT YOURSELF - OR THAT YOU ARE A FAILURE OR HAVE LET YOURSELF OR YOUR FAMILY DOWN: NOT AT ALL
4. FEELING TIRED OR HAVING LITTLE ENERGY: NOT AT ALL
SUM OF ALL RESPONSES TO PHQ QUESTIONS 1-9: 1
SUM OF ALL RESPONSES TO PHQ QUESTIONS 1-9: 1
9. THOUGHTS THAT YOU WOULD BE BETTER OFF DEAD, OR OF HURTING YOURSELF: NOT AT ALL
2. FEELING DOWN, DEPRESSED OR HOPELESS: SEVERAL DAYS
10. IF YOU CHECKED OFF ANY PROBLEMS, HOW DIFFICULT HAVE THESE PROBLEMS MADE IT FOR YOU TO DO YOUR WORK, TAKE CARE OF THINGS AT HOME, OR GET ALONG WITH OTHER PEOPLE: NOT DIFFICULT AT ALL
SUM OF ALL RESPONSES TO PHQ QUESTIONS 1-9: 1
7. TROUBLE CONCENTRATING ON THINGS, SUCH AS READING THE NEWSPAPER OR WATCHING TELEVISION: NOT AT ALL
SUM OF ALL RESPONSES TO PHQ QUESTIONS 1-9: 1
8. MOVING OR SPEAKING SO SLOWLY THAT OTHER PEOPLE COULD HAVE NOTICED. OR THE OPPOSITE, BEING SO FIGETY OR RESTLESS THAT YOU HAVE BEEN MOVING AROUND A LOT MORE THAN USUAL: NOT AT ALL
SUM OF ALL RESPONSES TO PHQ QUESTIONS 1-9: 1
1. LITTLE INTEREST OR PLEASURE IN DOING THINGS: NOT AT ALL

## 2025-05-06 NOTE — PROGRESS NOTES
Savanah Lawson is a 49 y.o. female who presents today for     Chief Complaint   Patient presents with    Check-Up     Paperwork needs filled out.        Checkup:  Savanah Lawson presents today for a yearly checkup and medication refills (ibuprofen, gabapentin meclizine).  Conditions include cervical neck strain/pain with cervical radiculopathy with fair control with the ibuprofen and the gabapentin.  She is also seen by the [facility] for pancytopenia, SLE, and idiopathic thrombocytopenia.  Chart review and history by patient indicates that these conditions are all stable at this time.    Patient is applying for foster care privileges.  Form is completed.  In addition, today's progress note, problem list, medication list all are attached.    PMFSH:  Patient's past medical, surgical, social and/or family history reviewed, updated in chart, and are non-contributory (unless otherwise stated).  Medications and allergies also reviewed and updated in chart.    Review of Systems  Review of Systems   HENT:  Negative for congestion, ear pain and sore throat.    Respiratory:  Negative for cough, shortness of breath and wheezing.    Cardiovascular:  Negative for chest pain, palpitations and leg swelling.   Gastrointestinal:  Negative for abdominal pain, blood in stool, constipation, diarrhea, nausea and vomiting.   Genitourinary:  Negative for dysuria, frequency, hematuria and urgency.   Musculoskeletal:  Positive for back pain, myalgias, neck pain and neck stiffness.   Skin:  Negative for rash.   Neurological:  Negative for dizziness, weakness and headaches.   Psychiatric/Behavioral:  The patient is not nervous/anxious.        Physical Exam:    VS:  /80 (BP Site: Right Upper Arm, Patient Position: Sitting, BP Cuff Size: Large Adult)   Pulse 81   Temp 97 °F (36.1 °C) (Infrared)   Resp 18   Ht 1.626 m (5' 4\")   Wt 78.5 kg (173 lb)   LMP 03/25/2015 (Exact Date)   SpO2 95%   BMI 29.70 kg/m²

## 2025-06-04 ENCOUNTER — OFFICE VISIT (OUTPATIENT)
Dept: FAMILY MEDICINE CLINIC | Age: 50
End: 2025-06-04
Payer: MEDICARE

## 2025-06-04 VITALS
DIASTOLIC BLOOD PRESSURE: 78 MMHG | BODY MASS INDEX: 30.22 KG/M2 | WEIGHT: 177 LBS | SYSTOLIC BLOOD PRESSURE: 138 MMHG | HEART RATE: 86 BPM | HEIGHT: 64 IN | OXYGEN SATURATION: 98 % | RESPIRATION RATE: 16 BRPM | TEMPERATURE: 98.1 F

## 2025-06-04 DIAGNOSIS — M32.9 SLE (SYSTEMIC LUPUS ERYTHEMATOSUS RELATED SYNDROME) (HCC): ICD-10-CM

## 2025-06-04 DIAGNOSIS — Z00.00 INITIAL MEDICARE ANNUAL WELLNESS VISIT: ICD-10-CM

## 2025-06-04 DIAGNOSIS — Z00.00 MEDICARE ANNUAL WELLNESS VISIT, INITIAL: Primary | ICD-10-CM

## 2025-06-04 PROCEDURE — G0438 PPPS, INITIAL VISIT: HCPCS | Performed by: FAMILY MEDICINE

## 2025-06-04 RX ORDER — OLANZAPINE 10 MG/1
TABLET, FILM COATED ORAL
COMMUNITY
Start: 2025-05-29

## 2025-06-04 RX ORDER — ARIPIPRAZOLE 5 MG/1
TABLET ORAL
COMMUNITY
Start: 2025-05-29

## 2025-06-04 ASSESSMENT — PATIENT HEALTH QUESTIONNAIRE - PHQ9
4. FEELING TIRED OR HAVING LITTLE ENERGY: NEARLY EVERY DAY
1. LITTLE INTEREST OR PLEASURE IN DOING THINGS: SEVERAL DAYS
8. MOVING OR SPEAKING SO SLOWLY THAT OTHER PEOPLE COULD HAVE NOTICED. OR THE OPPOSITE, BEING SO FIGETY OR RESTLESS THAT YOU HAVE BEEN MOVING AROUND A LOT MORE THAN USUAL: NOT AT ALL
SUM OF ALL RESPONSES TO PHQ QUESTIONS 1-9: 4
3. TROUBLE FALLING OR STAYING ASLEEP: NOT AT ALL
9. THOUGHTS THAT YOU WOULD BE BETTER OFF DEAD, OR OF HURTING YOURSELF: NOT AT ALL
SUM OF ALL RESPONSES TO PHQ QUESTIONS 1-9: 4
2. FEELING DOWN, DEPRESSED OR HOPELESS: NEARLY EVERY DAY
SUM OF ALL RESPONSES TO PHQ QUESTIONS 1-9: 4
6. FEELING BAD ABOUT YOURSELF - OR THAT YOU ARE A FAILURE OR HAVE LET YOURSELF OR YOUR FAMILY DOWN: NOT AT ALL
10. IF YOU CHECKED OFF ANY PROBLEMS, HOW DIFFICULT HAVE THESE PROBLEMS MADE IT FOR YOU TO DO YOUR WORK, TAKE CARE OF THINGS AT HOME, OR GET ALONG WITH OTHER PEOPLE: SOMEWHAT DIFFICULT
SUM OF ALL RESPONSES TO PHQ QUESTIONS 1-9: 4
7. TROUBLE CONCENTRATING ON THINGS, SUCH AS READING THE NEWSPAPER OR WATCHING TELEVISION: NEARLY EVERY DAY
5. POOR APPETITE OR OVEREATING: NEARLY EVERY DAY
2. FEELING DOWN, DEPRESSED OR HOPELESS: NEARLY EVERY DAY
SUM OF ALL RESPONSES TO PHQ QUESTIONS 1-9: 12

## 2025-06-17 NOTE — PROGRESS NOTES
Medicare Annual Wellness Visit    Savanah Lawson is here for    Chief Complaint   Patient presents with    Medicare AWV        Assessment & Plan     Medicare annual wellness visit, initial: While patient was cooperative and answering the questions, she was uncooperative in discussion and declined most everything offered    SLE (systemic lupus erythematosus related syndrome) (HCC): Patient reports that she gets all her care at the [facility]      Follow up:  Return in about 1 year (around 6/4/2026) for Fasting lab work 1 week prior, Schedule Medicare AWV.       Subjective     The following acute and/or chronic problems were also addressed today:    SLE:  Patient is under the care for her SLE and multiple complications at the [facility].    While the patient did not complete the Medicare annual wellness visit questionnaire, she was not engaged in discussing the outcome of their questions during today's visit    Patient's complete Health Risk Assessment and screening values have been reviewed and are found in Flowsheets. The following problems were reviewed today and where indicated follow up appointments were made and/or referrals ordered.    Positive Risk Factor Screenings with Interventions:    Fall Risk:  Do you feel unsteady or are you worried about falling? : (!) yes  2 or more falls in past year?: no  Fall with injury in past year?: no     Interventions:    Patient declines any further evaluation or treatment           Self-assessment of health:  In general, how would you say your health is?: (!) Poor    Interventions:  Patient declines any further evaluation or treatment     Inactivity:  On average, how many days per week do you engage in moderate to strenuous exercise (like a brisk walk)?: 0 days (!) Abnormal  On average, how many minutes do you engage in exercise at this level?: 0 min    Interventions:  Patient declined any further interventions or treatment     Abnormal BMI

## 2025-08-19 ENCOUNTER — OFFICE VISIT (OUTPATIENT)
Dept: FAMILY MEDICINE CLINIC | Age: 50
End: 2025-08-19
Payer: MEDICARE

## 2025-08-19 VITALS
WEIGHT: 177 LBS | BODY MASS INDEX: 30.22 KG/M2 | DIASTOLIC BLOOD PRESSURE: 80 MMHG | HEART RATE: 76 BPM | OXYGEN SATURATION: 96 % | RESPIRATION RATE: 18 BRPM | SYSTOLIC BLOOD PRESSURE: 130 MMHG | TEMPERATURE: 97 F | HEIGHT: 64 IN

## 2025-08-19 DIAGNOSIS — L30.9 DERMATITIS: Primary | ICD-10-CM

## 2025-08-19 PROCEDURE — 1036F TOBACCO NON-USER: CPT | Performed by: NURSE PRACTITIONER

## 2025-08-19 PROCEDURE — 3017F COLORECTAL CA SCREEN DOC REV: CPT | Performed by: NURSE PRACTITIONER

## 2025-08-19 PROCEDURE — G8417 CALC BMI ABV UP PARAM F/U: HCPCS | Performed by: NURSE PRACTITIONER

## 2025-08-19 PROCEDURE — G8427 DOCREV CUR MEDS BY ELIG CLIN: HCPCS | Performed by: NURSE PRACTITIONER

## 2025-08-19 PROCEDURE — 99213 OFFICE O/P EST LOW 20 MIN: CPT | Performed by: NURSE PRACTITIONER

## 2025-08-19 RX ORDER — OLANZAPINE 15 MG/1
TABLET, FILM COATED ORAL
COMMUNITY
Start: 2025-06-27